# Patient Record
Sex: FEMALE | Race: WHITE | NOT HISPANIC OR LATINO | Employment: OTHER | URBAN - METROPOLITAN AREA
[De-identification: names, ages, dates, MRNs, and addresses within clinical notes are randomized per-mention and may not be internally consistent; named-entity substitution may affect disease eponyms.]

---

## 2021-12-07 ENCOUNTER — LAB REQUISITION (OUTPATIENT)
Dept: LAB | Facility: CLINIC | Age: 86
End: 2021-12-07
Payer: MEDICARE

## 2021-12-07 DIAGNOSIS — I10 ESSENTIAL (PRIMARY) HYPERTENSION: ICD-10-CM

## 2021-12-08 LAB
ANION GAP SERPL CALCULATED.3IONS-SCNC: 14 MMOL/L (ref 5–18)
BUN SERPL-MCNC: 22 MG/DL (ref 8–28)
CALCIUM SERPL-MCNC: 8.8 MG/DL (ref 8.5–10.5)
CHLORIDE BLD-SCNC: 98 MMOL/L (ref 98–107)
CO2 SERPL-SCNC: 22 MMOL/L (ref 22–31)
CREAT SERPL-MCNC: 1.04 MG/DL (ref 0.6–1.1)
GFR SERPL CREATININE-BSD FRML MDRD: 49 ML/MIN/1.73M2
GLUCOSE BLD-MCNC: 88 MG/DL (ref 70–125)
POTASSIUM BLD-SCNC: 4.4 MMOL/L (ref 3.5–5)
SODIUM SERPL-SCNC: 134 MMOL/L (ref 136–145)

## 2021-12-08 PROCEDURE — 80048 BASIC METABOLIC PNL TOTAL CA: CPT | Mod: ORL | Performed by: FAMILY MEDICINE

## 2021-12-08 PROCEDURE — 36415 COLL VENOUS BLD VENIPUNCTURE: CPT | Mod: ORL | Performed by: FAMILY MEDICINE

## 2021-12-15 ENCOUNTER — LAB REQUISITION (OUTPATIENT)
Dept: LAB | Facility: CLINIC | Age: 86
End: 2021-12-15
Payer: MEDICARE

## 2021-12-15 DIAGNOSIS — R60.9 EDEMA, UNSPECIFIED: ICD-10-CM

## 2021-12-15 DIAGNOSIS — D64.9 ANEMIA, UNSPECIFIED: ICD-10-CM

## 2021-12-15 LAB
BASOPHILS # BLD AUTO: 0 10E3/UL (ref 0–0.2)
BASOPHILS NFR BLD AUTO: 0 %
BNP SERPL-MCNC: 461 PG/ML (ref 0–167)
EOSINOPHIL # BLD AUTO: 0.1 10E3/UL (ref 0–0.7)
EOSINOPHIL NFR BLD AUTO: 1 %
ERYTHROCYTE [DISTWIDTH] IN BLOOD BY AUTOMATED COUNT: 13.9 % (ref 10–15)
HCT VFR BLD AUTO: 39.4 % (ref 35–47)
HGB BLD-MCNC: 13.3 G/DL (ref 11.7–15.7)
IMM GRANULOCYTES # BLD: 0 10E3/UL
IMM GRANULOCYTES NFR BLD: 0 %
LYMPHOCYTES # BLD AUTO: 0.4 10E3/UL (ref 0.8–5.3)
LYMPHOCYTES NFR BLD AUTO: 9 %
MCH RBC QN AUTO: 32.9 PG (ref 26.5–33)
MCHC RBC AUTO-ENTMCNC: 33.8 G/DL (ref 31.5–36.5)
MCV RBC AUTO: 98 FL (ref 78–100)
MONOCYTES # BLD AUTO: 0.6 10E3/UL (ref 0–1.3)
MONOCYTES NFR BLD AUTO: 13 %
NEUTROPHILS # BLD AUTO: 3.6 10E3/UL (ref 1.6–8.3)
NEUTROPHILS NFR BLD AUTO: 77 %
NRBC # BLD AUTO: 0 10E3/UL
NRBC BLD AUTO-RTO: 0 /100
PLATELET # BLD AUTO: 134 10E3/UL (ref 150–450)
RBC # BLD AUTO: 4.04 10E6/UL (ref 3.8–5.2)
WBC # BLD AUTO: 4.7 10E3/UL (ref 4–11)

## 2021-12-15 PROCEDURE — 83880 ASSAY OF NATRIURETIC PEPTIDE: CPT | Mod: ORL | Performed by: NURSE PRACTITIONER

## 2021-12-15 PROCEDURE — 85025 COMPLETE CBC W/AUTO DIFF WBC: CPT | Mod: ORL | Performed by: NURSE PRACTITIONER

## 2021-12-15 PROCEDURE — 36415 COLL VENOUS BLD VENIPUNCTURE: CPT | Mod: ORL | Performed by: NURSE PRACTITIONER

## 2021-12-16 ENCOUNTER — LAB REQUISITION (OUTPATIENT)
Dept: LAB | Facility: CLINIC | Age: 86
End: 2021-12-16
Payer: MEDICARE

## 2021-12-16 LAB — EJECTION FRACTION: NORMAL %

## 2021-12-16 PROCEDURE — U0003 INFECTIOUS AGENT DETECTION BY NUCLEIC ACID (DNA OR RNA); SEVERE ACUTE RESPIRATORY SYNDROME CORONAVIRUS 2 (SARS-COV-2) (CORONAVIRUS DISEASE [COVID-19]), AMPLIFIED PROBE TECHNIQUE, MAKING USE OF HIGH THROUGHPUT TECHNOLOGIES AS DESCRIBED BY CMS-2020-01-R: HCPCS | Mod: ORL | Performed by: NURSE PRACTITIONER

## 2021-12-17 LAB — SARS-COV-2 RNA RESP QL NAA+PROBE: NEGATIVE

## 2021-12-23 ENCOUNTER — LAB REQUISITION (OUTPATIENT)
Dept: LAB | Facility: CLINIC | Age: 86
End: 2021-12-23
Payer: MEDICARE

## 2021-12-23 DIAGNOSIS — R94.6 ABNORMAL RESULTS OF THYROID FUNCTION STUDIES: ICD-10-CM

## 2021-12-23 DIAGNOSIS — Z79.899 OTHER LONG TERM (CURRENT) DRUG THERAPY: ICD-10-CM

## 2021-12-23 DIAGNOSIS — Z13.228 ENCOUNTER FOR SCREENING FOR OTHER METABOLIC DISORDERS: ICD-10-CM

## 2021-12-23 PROCEDURE — 84443 ASSAY THYROID STIM HORMONE: CPT | Mod: ORL | Performed by: NURSE PRACTITIONER

## 2021-12-23 PROCEDURE — 36415 COLL VENOUS BLD VENIPUNCTURE: CPT | Mod: ORL | Performed by: NURSE PRACTITIONER

## 2021-12-23 PROCEDURE — 80053 COMPREHEN METABOLIC PANEL: CPT | Mod: ORL | Performed by: NURSE PRACTITIONER

## 2021-12-24 ENCOUNTER — LAB REQUISITION (OUTPATIENT)
Dept: LAB | Facility: CLINIC | Age: 86
End: 2021-12-24
Payer: MEDICARE

## 2021-12-24 DIAGNOSIS — R82.79 OTHER ABNORMAL FINDINGS ON MICROBIOLOGICAL EXAMINATION OF URINE: ICD-10-CM

## 2021-12-24 LAB
ALBUMIN SERPL-MCNC: 3.7 G/DL (ref 3.5–5)
ALBUMIN UR-MCNC: 30 MG/DL
ALP SERPL-CCNC: 46 U/L (ref 45–120)
ALT SERPL W P-5'-P-CCNC: <9 U/L (ref 0–45)
ANION GAP SERPL CALCULATED.3IONS-SCNC: 13 MMOL/L (ref 5–18)
APPEARANCE UR: CLEAR
AST SERPL W P-5'-P-CCNC: 21 U/L (ref 0–40)
BILIRUB SERPL-MCNC: 1.1 MG/DL (ref 0–1)
BILIRUB UR QL STRIP: NEGATIVE
BUN SERPL-MCNC: 22 MG/DL (ref 8–28)
CALCIUM SERPL-MCNC: 9.1 MG/DL (ref 8.5–10.5)
CHLORIDE BLD-SCNC: 94 MMOL/L (ref 98–107)
CO2 SERPL-SCNC: 22 MMOL/L (ref 22–31)
COLOR UR AUTO: YELLOW
CREAT SERPL-MCNC: 1.27 MG/DL (ref 0.6–1.1)
GFR SERPL CREATININE-BSD FRML MDRD: 41 ML/MIN/1.73M2
GLUCOSE BLD-MCNC: 117 MG/DL (ref 70–125)
GLUCOSE UR STRIP-MCNC: NEGATIVE MG/DL
HGB UR QL STRIP: NEGATIVE
KETONES UR STRIP-MCNC: NEGATIVE MG/DL
LEUKOCYTE ESTERASE UR QL STRIP: NEGATIVE
MUCOUS THREADS #/AREA URNS LPF: PRESENT /LPF
NITRATE UR QL: NEGATIVE
PH UR STRIP: 5.5 [PH] (ref 5–7)
POTASSIUM BLD-SCNC: 4 MMOL/L (ref 3.5–5)
PROT SERPL-MCNC: 6.1 G/DL (ref 6–8)
RBC URINE: 0 /HPF
SODIUM SERPL-SCNC: 129 MMOL/L (ref 136–145)
SP GR UR STRIP: 1.02 (ref 1–1.03)
SQUAMOUS EPITHELIAL: 3 /HPF
TRANSITIONAL EPI: <1 /HPF
TSH SERPL DL<=0.005 MIU/L-ACNC: 2.66 UIU/ML (ref 0.3–5)
UROBILINOGEN UR STRIP-MCNC: <2 MG/DL
WBC URINE: 5 /HPF

## 2021-12-24 PROCEDURE — 81001 URINALYSIS AUTO W/SCOPE: CPT | Mod: ORL | Performed by: NURSE PRACTITIONER

## 2022-03-15 ENCOUNTER — LAB REQUISITION (OUTPATIENT)
Dept: LAB | Facility: CLINIC | Age: 87
End: 2022-03-15
Payer: MEDICARE

## 2022-03-15 DIAGNOSIS — I10 ESSENTIAL (PRIMARY) HYPERTENSION: ICD-10-CM

## 2022-03-16 LAB
ANION GAP SERPL CALCULATED.3IONS-SCNC: 11 MMOL/L (ref 5–18)
BUN SERPL-MCNC: 19 MG/DL (ref 8–28)
CALCIUM SERPL-MCNC: 9.1 MG/DL (ref 8.5–10.5)
CHLORIDE BLD-SCNC: 97 MMOL/L (ref 98–107)
CO2 SERPL-SCNC: 29 MMOL/L (ref 22–31)
CREAT SERPL-MCNC: 0.82 MG/DL (ref 0.6–1.1)
GFR SERPL CREATININE-BSD FRML MDRD: 69 ML/MIN/1.73M2
GLUCOSE BLD-MCNC: 97 MG/DL (ref 70–125)
POTASSIUM BLD-SCNC: 3.8 MMOL/L (ref 3.5–5)
SODIUM SERPL-SCNC: 137 MMOL/L (ref 136–145)

## 2022-03-16 PROCEDURE — P9604 ONE-WAY ALLOW PRORATED TRIP: HCPCS | Mod: ORL | Performed by: NURSE PRACTITIONER

## 2022-03-16 PROCEDURE — 80048 BASIC METABOLIC PNL TOTAL CA: CPT | Mod: ORL | Performed by: NURSE PRACTITIONER

## 2022-03-16 PROCEDURE — 36415 COLL VENOUS BLD VENIPUNCTURE: CPT | Mod: ORL | Performed by: NURSE PRACTITIONER

## 2022-06-03 ENCOUNTER — TRANSFERRED RECORDS (OUTPATIENT)
Dept: HEALTH INFORMATION MANAGEMENT | Facility: CLINIC | Age: 87
End: 2022-06-03
Payer: MEDICARE

## 2022-06-07 ENCOUNTER — LAB REQUISITION (OUTPATIENT)
Dept: LAB | Facility: CLINIC | Age: 87
End: 2022-06-07
Payer: MEDICARE

## 2022-06-07 DIAGNOSIS — N39.0 URINARY TRACT INFECTION, SITE NOT SPECIFIED: ICD-10-CM

## 2022-06-07 LAB
ALBUMIN UR-MCNC: NEGATIVE MG/DL
APPEARANCE UR: CLEAR
BILIRUB UR QL STRIP: NEGATIVE
COLOR UR AUTO: COLORLESS
GLUCOSE UR STRIP-MCNC: NEGATIVE MG/DL
HGB UR QL STRIP: ABNORMAL
HYALINE CASTS: 3 /LPF
KETONES UR STRIP-MCNC: NEGATIVE MG/DL
LEUKOCYTE ESTERASE UR QL STRIP: NEGATIVE
NITRATE UR QL: NEGATIVE
PH UR STRIP: 6.5 [PH] (ref 5–7)
RBC URINE: 1 /HPF
SP GR UR STRIP: 1.01 (ref 1–1.03)
SQUAMOUS EPITHELIAL: 1 /HPF
TRANSITIONAL EPI: <1 /HPF
UROBILINOGEN UR STRIP-MCNC: <2 MG/DL
WBC URINE: <1 /HPF

## 2022-06-07 PROCEDURE — 81001 URINALYSIS AUTO W/SCOPE: CPT | Mod: ORL | Performed by: NURSE PRACTITIONER

## 2022-06-10 ENCOUNTER — TRANSCRIBE ORDERS (OUTPATIENT)
Dept: OTHER | Age: 87
End: 2022-06-10
Payer: MEDICARE

## 2022-06-10 DIAGNOSIS — H34.8110 CENTRAL RETINAL VEIN OCCLUSION WITH MACULAR EDEMA OF RIGHT EYE (H): ICD-10-CM

## 2022-06-10 DIAGNOSIS — H25.813 COMBINED FORMS OF AGE-RELATED CATARACT, BILATERAL: Primary | ICD-10-CM

## 2022-06-13 ENCOUNTER — TELEPHONE (OUTPATIENT)
Dept: OPHTHALMOLOGY | Facility: CLINIC | Age: 87
End: 2022-06-13
Payer: MEDICARE

## 2022-06-13 NOTE — TELEPHONE ENCOUNTER
Sonja Price [phone]      Spoke to daughter.    H/o central retina occlusion around 2013    Pt seen by ophthalmologist for cataract and having difficulty with     Recommendation for evaluation with [facility] to help accommodate with physical disabilities and with retina provider for h/o retina vein occlusion    Scheduled first available with Dr. Doty/Dr. Wong with Dr. Doty June 29th    Pt/daughter aware of date/time/location/duration at [facility]    Would appreciate new patient packet    Note to patient communicator to assist in mailing out    Reid Kahn RN 4:26 PM 06/14/22                [facility] Call Center    Phone Message    May a detailed message be left on voicemail: yes     Reason for Call: Appointment Intake    Referring Provider Name: Dr Hesham Lauren   Diagnosis and/or Symptoms: Combined forms of age-related cataract, bilateral   Central retinal vein occlusion with macular edema of right eye    - Please advise on how to get pt scheduled and please reach out to pts daughter Sonja to get appt set . Thank you     Action Taken: Other: EYE     Travel Screening: Not Applicable

## 2022-06-14 ENCOUNTER — LAB REQUISITION (OUTPATIENT)
Dept: LAB | Facility: CLINIC | Age: 87
End: 2022-06-14
Payer: MEDICARE

## 2022-06-14 DIAGNOSIS — D64.9 ANEMIA, UNSPECIFIED: ICD-10-CM

## 2022-06-14 DIAGNOSIS — I10 ESSENTIAL (PRIMARY) HYPERTENSION: ICD-10-CM

## 2022-06-15 ENCOUNTER — TELEPHONE (OUTPATIENT)
Dept: OPHTHALMOLOGY | Facility: CLINIC | Age: 87
End: 2022-06-15
Payer: MEDICARE

## 2022-06-15 DIAGNOSIS — H34.8332: Primary | ICD-10-CM

## 2022-06-15 LAB
ANION GAP SERPL CALCULATED.3IONS-SCNC: 12 MMOL/L (ref 5–18)
BUN SERPL-MCNC: 27 MG/DL (ref 8–28)
CALCIUM SERPL-MCNC: 9.1 MG/DL (ref 8.5–10.5)
CHLORIDE BLD-SCNC: 99 MMOL/L (ref 98–107)
CO2 SERPL-SCNC: 27 MMOL/L (ref 22–31)
CREAT SERPL-MCNC: 0.99 MG/DL (ref 0.6–1.1)
ERYTHROCYTE [DISTWIDTH] IN BLOOD BY AUTOMATED COUNT: 12.7 % (ref 10–15)
GFR SERPL CREATININE-BSD FRML MDRD: 55 ML/MIN/1.73M2
GLUCOSE BLD-MCNC: 103 MG/DL (ref 70–125)
HCT VFR BLD AUTO: 45.7 % (ref 35–47)
HGB BLD-MCNC: 14.9 G/DL (ref 11.7–15.7)
MCH RBC QN AUTO: 33 PG (ref 26.5–33)
MCHC RBC AUTO-ENTMCNC: 32.6 G/DL (ref 31.5–36.5)
MCV RBC AUTO: 101 FL (ref 78–100)
PLATELET # BLD AUTO: 144 10E3/UL (ref 150–450)
POTASSIUM BLD-SCNC: 3.8 MMOL/L (ref 3.5–5)
RBC # BLD AUTO: 4.51 10E6/UL (ref 3.8–5.2)
SODIUM SERPL-SCNC: 138 MMOL/L (ref 136–145)
WBC # BLD AUTO: 5.8 10E3/UL (ref 4–11)

## 2022-06-15 PROCEDURE — 80048 BASIC METABOLIC PNL TOTAL CA: CPT | Mod: ORL | Performed by: NURSE PRACTITIONER

## 2022-06-15 PROCEDURE — 36415 COLL VENOUS BLD VENIPUNCTURE: CPT | Mod: ORL | Performed by: NURSE PRACTITIONER

## 2022-06-15 PROCEDURE — P9604 ONE-WAY ALLOW PRORATED TRIP: HCPCS | Mod: ORL | Performed by: NURSE PRACTITIONER

## 2022-06-15 PROCEDURE — 85027 COMPLETE CBC AUTOMATED: CPT | Mod: ORL | Performed by: NURSE PRACTITIONER

## 2022-06-15 NOTE — TELEPHONE ENCOUNTER
Mailed out a new pt packet with time, date and a map     Rosa Guadarrama Communication Facilitator on 6/15/2022 at 8:41 AM

## 2022-06-19 ENCOUNTER — LAB REQUISITION (OUTPATIENT)
Dept: LAB | Facility: CLINIC | Age: 87
End: 2022-06-19
Payer: MEDICARE

## 2022-06-19 DIAGNOSIS — R79.89 OTHER SPECIFIED ABNORMAL FINDINGS OF BLOOD CHEMISTRY: ICD-10-CM

## 2022-06-29 ENCOUNTER — OFFICE VISIT (OUTPATIENT)
Dept: OPHTHALMOLOGY | Facility: CLINIC | Age: 87
End: 2022-06-29
Attending: OPHTHALMOLOGY
Payer: MEDICARE

## 2022-06-29 DIAGNOSIS — H04.123 DRY EYE SYNDROME OF BOTH EYES: ICD-10-CM

## 2022-06-29 DIAGNOSIS — H25.813 COMBINED FORMS OF AGE-RELATED CATARACT, BILATERAL: ICD-10-CM

## 2022-06-29 DIAGNOSIS — H34.8110 CENTRAL RETINAL VEIN OCCLUSION WITH MACULAR EDEMA OF RIGHT EYE (H): Primary | ICD-10-CM

## 2022-06-29 DIAGNOSIS — H35.371 EPIRETINAL MEMBRANE (ERM) OF RIGHT EYE: ICD-10-CM

## 2022-06-29 PROCEDURE — 92004 COMPRE OPH EXAM NEW PT 1/>: CPT | Performed by: OPHTHALMOLOGY

## 2022-06-29 PROCEDURE — G0463 HOSPITAL OUTPT CLINIC VISIT: HCPCS | Mod: 25

## 2022-06-29 PROCEDURE — 92134 CPTRZ OPH DX IMG PST SGM RTA: CPT | Performed by: OPHTHALMOLOGY

## 2022-06-29 PROCEDURE — 76516 ECHO EXAM OF EYE: CPT | Performed by: OPHTHALMOLOGY

## 2022-06-29 RX ORDER — ACETAMINOPHEN 325 MG/1
650 TABLET ORAL
COMMUNITY
Start: 2022-03-02 | End: 2024-01-01

## 2022-06-29 RX ORDER — METOPROLOL SUCCINATE 50 MG/1
TABLET, EXTENDED RELEASE ORAL
COMMUNITY
Start: 2022-06-14 | End: 2024-01-01

## 2022-06-29 RX ORDER — ALLOPURINOL 100 MG/1
TABLET ORAL
COMMUNITY
Start: 2022-06-14 | End: 2022-09-16

## 2022-06-29 RX ORDER — FUROSEMIDE 40 MG
TABLET ORAL
COMMUNITY
Start: 2022-06-14 | End: 2024-01-01

## 2022-06-29 RX ORDER — CHOLECALCIFEROL (VITAMIN D3) 125 MCG
1 CAPSULE ORAL
COMMUNITY
End: 2024-01-01

## 2022-06-29 RX ORDER — CARBIDOPA AND LEVODOPA 50; 200 MG/1; MG/1
1 TABLET, EXTENDED RELEASE ORAL AT BEDTIME
COMMUNITY
Start: 2021-09-13 | End: 2024-01-01

## 2022-06-29 RX ORDER — PRAMIPEXOLE DIHYDROCHLORIDE 0.12 MG/1
TABLET ORAL
COMMUNITY
Start: 2022-02-07 | End: 2024-01-01

## 2022-06-29 RX ORDER — SALIVA STIMULANT COMB. NO.3
SPRAY, NON-AEROSOL (ML) MUCOUS MEMBRANE
COMMUNITY
Start: 2022-02-16 | End: 2024-01-01

## 2022-06-29 RX ORDER — SERTRALINE HYDROCHLORIDE 25 MG/1
TABLET, FILM COATED ORAL
COMMUNITY
Start: 2022-06-23 | End: 2024-01-01

## 2022-06-29 RX ORDER — NYSTATIN 100000 [USP'U]/G
POWDER TOPICAL
COMMUNITY
Start: 2022-06-21 | End: 2024-01-01

## 2022-06-29 RX ORDER — POTASSIUM CHLORIDE 750 MG/1
10 TABLET, EXTENDED RELEASE ORAL
COMMUNITY
Start: 2022-02-01 | End: 2024-01-01

## 2022-06-29 RX ORDER — LOSARTAN POTASSIUM 50 MG/1
TABLET ORAL
COMMUNITY
Start: 2022-06-14 | End: 2022-09-16

## 2022-06-29 RX ORDER — SIMETHICONE 80 MG
80 TABLET,CHEWABLE ORAL
COMMUNITY
Start: 2022-01-13 | End: 2024-01-01

## 2022-06-29 RX ORDER — APIXABAN 5 MG/1
TABLET, FILM COATED ORAL
COMMUNITY
Start: 2022-06-14 | End: 2024-01-01

## 2022-06-29 ASSESSMENT — SLIT LAMP EXAM - LIDS
COMMENTS: 1+ DERMATOCHALASIS, 1+ PTOSIS
COMMENTS: 1+ DERMATOCHALASIS, 1+ PTOSIS

## 2022-06-29 ASSESSMENT — CONF VISUAL FIELD
OS_INFERIOR_NASAL_RESTRICTION: 1
OS_SUPERIOR_NASAL_RESTRICTION: 1
OD_SUPERIOR_NASAL_RESTRICTION: 3
OD_INFERIOR_TEMPORAL_RESTRICTION: 1
OS_INFERIOR_TEMPORAL_RESTRICTION: 1
OS_SUPERIOR_TEMPORAL_RESTRICTION: 1
OD_INFERIOR_NASAL_RESTRICTION: 1
OD_SUPERIOR_TEMPORAL_RESTRICTION: 1

## 2022-06-29 ASSESSMENT — REFRACTION_WEARINGRX
OS_AXIS: 172
OS_CYLINDER: +1.50
OD_AXIS: 005
OD_CYLINDER: +1.25
OS_ADD: +2.75
OS_SPHERE: -5.25
OD_SPHERE: -5.50
SPECS_TYPE: TRIFOCAL
OD_ADD: +2.75

## 2022-06-29 ASSESSMENT — TONOMETRY
OD_IOP_MMHG: 14
OS_IOP_MMHG: 16
IOP_METHOD: TONOPEN

## 2022-06-29 ASSESSMENT — CUP TO DISC RATIO
OD_RATIO: 0.1
OS_RATIO: 0.2

## 2022-06-29 ASSESSMENT — VISUAL ACUITY
OD_CC+: -1
OS_CC+: -2
OD_CC: 20/125
METHOD: SNELLEN - LINEAR
OS_CC: 20/70
CORRECTION_TYPE: GLASSES

## 2022-06-29 ASSESSMENT — EXTERNAL EXAM - LEFT EYE: OS_EXAM: NORMAL

## 2022-06-29 ASSESSMENT — EXTERNAL EXAM - RIGHT EYE: OD_EXAM: NORMAL

## 2022-06-29 NOTE — NURSING NOTE
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  Negative for glare, haloes and eye pain    Treatments tried: artificial tears    Pain scale: 0/10    Comments: Hemispheric branch retinal vein occlusion (BRVO) of both eyes and cataracts              Comments     She has not had an eye exam for the past few years.  She was getting injections with Dr Siegel in [place] and with Dr Crews in [place] but chose to stop them after a period of time, when her vision seemed uneffected by the treatment.  She tells me that her vision seems stable but poor.  She struggles to read small print.    Both eyes seem intermittently dry.  Using artificial tears does help the discomfort.    THERESE Rene 12:49 PM  June 29, 2022

## 2022-06-29 NOTE — PROGRESS NOTES
Goes by Moriah    CC -  Decreased vision right eye     INTERVAL HISTORY - Initial visit with me    HPI -   Leola Bui is a  86 year old year-old patient presenting for loss of vision right eye for years. She was told she has vein occlusion right eye and received multiple injection in [place] (by Dr. Siegel) and in [place] (Dr. Diaz). Last injection ~3 yrs ago  Vision is stable bello the last few years. Reading is difficult     RETINAL IMAGING:  OCT 6/29/2022  OD - CME, ERM  OS - normal foveal contour; drusen at fovea      ASSESSMENT & PLAN    1. Central retinal vein occlusion with macular edema of right eye    2. Combined forms of age-related cataract, bilateral    3. Epiretinal membrane (ERM) of right eye      History of CRVO right eye (records requested for review); S/P avastin injection (last 3 yrs ago)  Vision significantly affected by cataract ou  Will plan for CE IOL left eye first and then right eye; will re evaluate for CME and ERM after cataract surgery    Risks of surgery including but not limited to infection (rare but a devastating complication that will need intraocular antibiotics injection and possibly more surgeries), risk of retinal detachment, dropped nuclear pieces or dropped intraocular lens, glaucoma may need further medications, corneal edema that may need a long course of medication or even surgery were discussed  In length with the patient. We discussed about the benefits of CE IOL surgery and its alternatives. All the questions answered. Moriah and her daughters agreed and wanted to proceed.     Target refraction -0.25D  45 minute surgery  Resident/fellow participation    Will do left eye first; plan for phone appt for POD1; in one week, will do right eye CE IOL and examining left eye     She will see her PCP for pre op exam in 2 days    Call Daysi (daughter) for scheduling at       Complete documentation of historical and exam elements from today's encounter can be found in the full  encounter summary report (not reduplicated in this progress note). I personally obtained the chief complaint(s) and history of present illness.  I confirmed and edited as necessary the review of systems, past medical/surgical history, family history, social history, and examination findings as documented by others; and I examined the patient myself. I personally reviewed the relevant tests, images, and reports as documented above. I formulated and edited as necessary the assessment and plan and discussed the findings and management plan with the patient and family.     Contreras Doty MD, PhD

## 2022-07-01 DIAGNOSIS — H25.13 AGE-RELATED NUCLEAR CATARACT OF BOTH EYES: Primary | ICD-10-CM

## 2022-07-12 ENCOUNTER — TELEPHONE (OUTPATIENT)
Dept: OPHTHALMOLOGY | Facility: CLINIC | Age: 87
End: 2022-07-12

## 2022-07-12 NOTE — TELEPHONE ENCOUNTER
Called patient to schedule surgery with Dr. caldwell     Date of Surgery: 9/12 9/19    Location of surgery: [facility]    Pre-Op H&P: pcp    Imaging Scheduled:  No    Discussed COVID-19 Testing: Yes    Post-Op Appt Date: na    Surgery Packet Mailed: yes      Additional comments:   Spoke with Sonja(daughter) gave all the info regarding surgery also told her that a surgery packet would be mailed. She will call with any questions.     She did request a call from the nurse     Renetta Bell on 7/12/2022 at 3:08 PM

## 2022-08-23 ENCOUNTER — TELEPHONE (OUTPATIENT)
Dept: OPHTHALMOLOGY | Facility: CLINIC | Age: 87
End: 2022-08-23

## 2022-08-23 NOTE — TELEPHONE ENCOUNTER
Called and spoke to patient's daughter Sonja regarding questions. Mentioned that we would recommend showering prior to the surgery as is possible. She expressed that Ms. Bui was worried about her arm moving and disrupting the procedure. Discussed anesthesia for cataract surgery and reassured her that we would be able to carry out the surgery in a safe manner and would help the patient keep her arms at her sides during the procedure.

## 2022-08-23 NOTE — TELEPHONE ENCOUNTER
Sonja and Leola called with concerns for Leola's upcoming cataract procedures 09/12 and 09/19 with Dr. Contreras Fuentes. Leola has Parkinson disease and is worried about movement during surgery she wanted to know that this addressed/accounted for in preparing for surgery.     Sonja was also concerned about the 2 showers required prior to surgery as showering is difficult for Leola. I am sending these concerns to the provider and team to reach out with specific information based on Leola's case.

## 2022-09-07 NOTE — OR NURSING
Received phone call from patient and patients Daughter, Sonja Price, about pre op questions for 9/12 surgery. Patient wanted to make sure daughter was called for pre op call when it occurs and had questions about covid testing. Patient was also given fax number for her H&P.

## 2022-09-13 ENCOUNTER — TELEPHONE (OUTPATIENT)
Dept: OPHTHALMOLOGY | Facility: CLINIC | Age: 87
End: 2022-09-13

## 2022-09-15 ENCOUNTER — TELEPHONE (OUTPATIENT)
Dept: OPHTHALMOLOGY | Facility: CLINIC | Age: 87
End: 2022-09-15

## 2022-09-15 NOTE — TELEPHONE ENCOUNTER
Spoke with Sonja to confirm surgery dates for Leola, 09/19 and 10/03. I explained no drops are needed prior to surgery but more than likely post-op. Sonja says the Assisted Living facility needs provider orders in order to administer the drops to Leola following surgery.     I am passing this note along to the team so they are aware and can include orders for the assisted living following surgery.

## 2022-09-15 NOTE — TELEPHONE ENCOUNTER
Confirmed new surgery schedule    Patient is scheduled for surgery with Dr. Contreras Fuentes     Spoke with: Sonja Price     Date of Surgery: 09/19, and 10/03     Location: [facility]:  [address] [place] [id number]     H&P will be completed at: Was completed beginning of September, [facility]     COVID testing: Assisted Living    Post Op scheduled on 09/28 and 10/12     Surgery packet was sent already for previous dates, sending new Post op appointment listing     Additional comments: Advised RN will call 1 - 3 business days prior with arrival time and instructions. Informed patient they will need an adult  and responsible adult to stay with for 24 hours following surgery

## 2022-09-16 RX ORDER — LOSARTAN POTASSIUM 50 MG/1
50 TABLET ORAL
COMMUNITY
Start: 2021-06-28 | End: 2024-01-01

## 2022-09-16 RX ORDER — MIRABEGRON 25 MG/1
TABLET, FILM COATED, EXTENDED RELEASE ORAL
COMMUNITY
Start: 2022-09-09 | End: 2024-01-01

## 2022-09-16 RX ORDER — FLUTICASONE PROPIONATE 50 MCG
SPRAY, SUSPENSION (ML) NASAL
COMMUNITY
Start: 2022-04-27 | End: 2024-01-01

## 2022-09-16 RX ORDER — ALLOPURINOL 100 MG/1
100 TABLET ORAL
COMMUNITY
Start: 2021-06-28 | End: 2024-01-01

## 2022-09-16 RX ORDER — LIDOCAINE 4 G/G
PATCH TOPICAL
COMMUNITY
End: 2024-01-01

## 2022-09-19 ENCOUNTER — ANESTHESIA EVENT (OUTPATIENT)
Dept: SURGERY | Facility: CLINIC | Age: 87
End: 2022-09-19
Payer: MEDICARE

## 2022-09-19 ENCOUNTER — ANESTHESIA (OUTPATIENT)
Dept: SURGERY | Facility: CLINIC | Age: 87
End: 2022-09-19
Payer: MEDICARE

## 2022-09-19 ENCOUNTER — HOSPITAL ENCOUNTER (OUTPATIENT)
Facility: CLINIC | Age: 87
Discharge: HOME | End: 2022-09-19
Attending: OPHTHALMOLOGY | Admitting: OPHTHALMOLOGY
Payer: MEDICARE

## 2022-09-19 ENCOUNTER — TELEPHONE (OUTPATIENT)
Dept: OPHTHALMOLOGY | Facility: CLINIC | Age: 87
End: 2022-09-19

## 2022-09-19 VITALS
DIASTOLIC BLOOD PRESSURE: 133 MMHG | HEIGHT: 66 IN | SYSTOLIC BLOOD PRESSURE: 187 MMHG | HEART RATE: 73 BPM | WEIGHT: 155.6 LBS | TEMPERATURE: 97 F | OXYGEN SATURATION: 96 % | RESPIRATION RATE: 16 BRPM | BODY MASS INDEX: 25.01 KG/M2

## 2022-09-19 DIAGNOSIS — H25.12 NUCLEAR SCLEROTIC CATARACT OF LEFT EYE: Primary | ICD-10-CM

## 2022-09-19 LAB — GLUCOSE BLDC GLUCOMTR-MCNC: 113 MG/DL (ref 70–99)

## 2022-09-19 PROCEDURE — 710N000012 HC RECOVERY PHASE 2, PER MINUTE: Performed by: OPHTHALMOLOGY

## 2022-09-19 PROCEDURE — V2632 POST CHMBR INTRAOCULAR LENS: HCPCS | Performed by: OPHTHALMOLOGY

## 2022-09-19 PROCEDURE — 370N000017 HC ANESTHESIA TECHNICAL FEE, PER MIN: Performed by: OPHTHALMOLOGY

## 2022-09-19 PROCEDURE — 250N000011 HC RX IP 250 OP 636: Performed by: NURSE ANESTHETIST, CERTIFIED REGISTERED

## 2022-09-19 PROCEDURE — 999N000141 HC STATISTIC PRE-PROCEDURE NURSING ASSESSMENT: Performed by: OPHTHALMOLOGY

## 2022-09-19 PROCEDURE — 66984 XCAPSL CTRC RMVL W/O ECP: CPT | Mod: LT | Performed by: OPHTHALMOLOGY

## 2022-09-19 PROCEDURE — 250N000009 HC RX 250: Performed by: OPHTHALMOLOGY

## 2022-09-19 PROCEDURE — 360N000076 HC SURGERY LEVEL 3, PER MIN: Performed by: OPHTHALMOLOGY

## 2022-09-19 PROCEDURE — 250N000011 HC RX IP 250 OP 636: Performed by: OPHTHALMOLOGY

## 2022-09-19 PROCEDURE — 258N000003 HC RX IP 258 OP 636: Performed by: NURSE ANESTHETIST, CERTIFIED REGISTERED

## 2022-09-19 PROCEDURE — 272N000001 HC OR GENERAL SUPPLY STERILE: Performed by: OPHTHALMOLOGY

## 2022-09-19 PROCEDURE — 250N000009 HC RX 250: Performed by: STUDENT IN AN ORGANIZED HEALTH CARE EDUCATION/TRAINING PROGRAM

## 2022-09-19 PROCEDURE — 82962 GLUCOSE BLOOD TEST: CPT

## 2022-09-19 DEVICE — IMPLANTABLE DEVICE: Type: IMPLANTABLE DEVICE | Site: EYE | Status: FUNCTIONAL

## 2022-09-19 RX ORDER — PROPARACAINE HYDROCHLORIDE 5 MG/ML
1 SOLUTION/ DROPS OPHTHALMIC ONCE
Status: DISCONTINUED | OUTPATIENT
Start: 2022-09-19 | End: 2022-09-19 | Stop reason: HOSPADM

## 2022-09-19 RX ORDER — DICLOFENAC SODIUM 1 MG/ML
1 SOLUTION/ DROPS OPHTHALMIC
Status: DISCONTINUED | OUTPATIENT
Start: 2022-09-19 | End: 2022-09-19 | Stop reason: HOSPADM

## 2022-09-19 RX ORDER — PROPARACAINE HYDROCHLORIDE 5 MG/ML
1 SOLUTION/ DROPS OPHTHALMIC ONCE
Status: COMPLETED | OUTPATIENT
Start: 2022-09-19 | End: 2022-09-19

## 2022-09-19 RX ORDER — TRIAMCINOLONE ACETONIDE 40 MG/ML
INJECTION, SUSPENSION INTRA-ARTICULAR; INTRAMUSCULAR PRN
Status: SHIPPED | OUTPATIENT
Start: 2022-09-19

## 2022-09-19 RX ORDER — BALANCED SALT SOLUTION 6.4; .75; .48; .3; 3.9; 1.7 MG/ML; MG/ML; MG/ML; MG/ML; MG/ML; MG/ML
SOLUTION OPHTHALMIC PRN
Status: DISCONTINUED | OUTPATIENT
Start: 2022-09-19 | End: 2022-09-19 | Stop reason: HOSPADM

## 2022-09-19 RX ORDER — MOXIFLOXACIN IN NACL,ISO-OS/PF 1.6 MG/ML
SYRINGE (ML) INTRAOCULAR PRN
Status: DISCONTINUED | OUTPATIENT
Start: 2022-09-19 | End: 2022-09-19 | Stop reason: HOSPADM

## 2022-09-19 RX ORDER — ACETAMINOPHEN 325 MG/1
975 TABLET ORAL ONCE
Status: DISCONTINUED | OUTPATIENT
Start: 2022-09-19 | End: 2022-09-19 | Stop reason: HOSPADM

## 2022-09-19 RX ORDER — SODIUM CHLORIDE, SODIUM LACTATE, POTASSIUM CHLORIDE, CALCIUM CHLORIDE 600; 310; 30; 20 MG/100ML; MG/100ML; MG/100ML; MG/100ML
INJECTION, SOLUTION INTRAVENOUS CONTINUOUS
Status: DISCONTINUED | OUTPATIENT
Start: 2022-09-19 | End: 2022-09-19 | Stop reason: HOSPADM

## 2022-09-19 RX ORDER — CYCLOPENTOLAT/TROPIC/PHENYLEPH 1%-1%-2.5%
1 DROPS (EA) OPHTHALMIC (EYE)
Status: DISCONTINUED | OUTPATIENT
Start: 2022-09-19 | End: 2022-09-19 | Stop reason: HOSPADM

## 2022-09-19 RX ORDER — LIDOCAINE HYDROCHLORIDE 10 MG/ML
INJECTION, SOLUTION EPIDURAL; INFILTRATION; INTRACAUDAL; PERINEURAL PRN
Status: DISCONTINUED | OUTPATIENT
Start: 2022-09-19 | End: 2022-09-19 | Stop reason: HOSPADM

## 2022-09-19 RX ORDER — KETOROLAC TROMETHAMINE 5 MG/ML
1 SOLUTION OPHTHALMIC 4 TIMES DAILY
Qty: 5 ML | Refills: 0 | Status: SHIPPED | OUTPATIENT
Start: 2022-09-19 | End: 2024-01-01

## 2022-09-19 RX ORDER — FENTANYL CITRATE 50 UG/ML
INJECTION, SOLUTION INTRAMUSCULAR; INTRAVENOUS PRN
Status: DISCONTINUED | OUTPATIENT
Start: 2022-09-19 | End: 2022-09-19

## 2022-09-19 RX ORDER — METOPROLOL TARTRATE 50 MG
50 TABLET ORAL ONCE
Status: DISCONTINUED | OUTPATIENT
Start: 2022-09-19 | End: 2022-09-19

## 2022-09-19 RX ORDER — PREDNISOLONE ACETATE 10 MG/ML
1 SUSPENSION/ DROPS OPHTHALMIC 4 TIMES DAILY
Qty: 5 ML | Refills: 0 | Status: SHIPPED | OUTPATIENT
Start: 2022-09-19 | End: 2024-01-01

## 2022-09-19 RX ORDER — MOXIFLOXACIN 5 MG/ML
1 SOLUTION/ DROPS OPHTHALMIC
Status: DISCONTINUED | OUTPATIENT
Start: 2022-09-19 | End: 2022-09-19 | Stop reason: HOSPADM

## 2022-09-19 RX ORDER — SODIUM CHLORIDE, SODIUM LACTATE, POTASSIUM CHLORIDE, CALCIUM CHLORIDE 600; 310; 30; 20 MG/100ML; MG/100ML; MG/100ML; MG/100ML
INJECTION, SOLUTION INTRAVENOUS CONTINUOUS PRN
Status: DISCONTINUED | OUTPATIENT
Start: 2022-09-19 | End: 2022-09-19

## 2022-09-19 RX ORDER — MOXIFLOXACIN 5 MG/ML
1 SOLUTION/ DROPS OPHTHALMIC 3 TIMES DAILY
Qty: 3 ML | Refills: 0 | Status: SHIPPED | OUTPATIENT
Start: 2022-09-19 | End: 2024-01-01

## 2022-09-19 RX ADMIN — MOXIFLOXACIN OPHTHALMIC SOLUTION 1 DROP: 5 SOLUTION/ DROPS OPHTHALMIC at 07:20

## 2022-09-19 RX ADMIN — FENTANYL CITRATE 25 MCG: 50 INJECTION, SOLUTION INTRAMUSCULAR; INTRAVENOUS at 07:41

## 2022-09-19 RX ADMIN — Medication 1 DROP: at 07:29

## 2022-09-19 RX ADMIN — FENTANYL CITRATE 25 MCG: 50 INJECTION, SOLUTION INTRAMUSCULAR; INTRAVENOUS at 08:09

## 2022-09-19 RX ADMIN — DICLOFENAC SODIUM 1 DROP: 1 SOLUTION OPHTHALMIC at 07:27

## 2022-09-19 RX ADMIN — DICLOFENAC SODIUM 1 DROP: 1 SOLUTION OPHTHALMIC at 07:22

## 2022-09-19 RX ADMIN — MIDAZOLAM 1 MG: 1 INJECTION INTRAMUSCULAR; INTRAVENOUS at 07:37

## 2022-09-19 RX ADMIN — MOXIFLOXACIN OPHTHALMIC SOLUTION 1 DROP: 5 SOLUTION/ DROPS OPHTHALMIC at 07:26

## 2022-09-19 RX ADMIN — PROPARACAINE HYDROCHLORIDE 1 DROP: 5 SOLUTION/ DROPS OPHTHALMIC at 07:18

## 2022-09-19 RX ADMIN — Medication 1 DROP: at 07:24

## 2022-09-19 RX ADMIN — SODIUM CHLORIDE, POTASSIUM CHLORIDE, SODIUM LACTATE AND CALCIUM CHLORIDE: 600; 310; 30; 20 INJECTION, SOLUTION INTRAVENOUS at 07:35

## 2022-09-19 ASSESSMENT — ACTIVITIES OF DAILY LIVING (ADL)
ADLS_ACUITY_SCORE: 45
ADLS_ACUITY_SCORE: 35

## 2022-09-19 NOTE — ANESTHESIA CARE TRANSFER NOTE
Patient: Leola Bui    Procedure: Procedure(s):  PHACOEMULSIFICATION, CATARACT, WITH INTRAOCULAR LENS IMPLANT LEFT       Diagnosis: Combined forms of age-related cataract, bilateral [H25.813]  Diagnosis Additional Information: No value filed.    Anesthesia Type:   No value filed.     Note:    Oropharynx: oropharynx clear of all foreign objects  Level of Consciousness: awake  Oxygen Supplementation: room air    Independent Airway: airway patency satisfactory and stable  Dentition: dentition unchanged  Vital Signs Stable: post-procedure vital signs reviewed and stable  Report to RN Given: handoff report given  Patient transferred to: PACU    Handoff Report: Identifed the Patient, Identified the Reponsible Provider, Reviewed the pertinent medical history, Discussed the surgical course, Reviewed Intra-OP anesthesia mangement and issues during anesthesia, Set expectations for post-procedure period and Allowed opportunity for questions and acknowledgement of understanding      Vitals:  Vitals Value Taken Time   BP     Temp     Pulse     Resp     SpO2         Electronically Signed By: DALE Escobar CRNA  September 19, 2022  8:35 AM

## 2022-09-19 NOTE — TELEPHONE ENCOUNTER
[facility] Call Center    Phone Message    May a detailed message be left on voicemail: yes     Reason for Call: Form or Letter   Type or form/letter needing completion: Signed Orders (With Dr signature)  for Ketorolac drops, Pred Forte and Moxifloxacin to be administered at her assisted living facility.   Provider: Dr. Doty  Date form needed: ASAP  Once completed: Fax form to: [phone]      Action Taken: Message routed to:  [facility] EYE    Travel Screening: Not Applicable

## 2022-09-19 NOTE — ANESTHESIA POSTPROCEDURE EVALUATION
"Patient: Leola Bui    Procedure: Procedure(s):  PHACOEMULSIFICATION, CATARACT, WITH INTRAOCULAR LENS IMPLANT LEFT       Anesthesia Type:  MAC    Note:  Disposition: Outpatient   Postop Pain Control: Uneventful            Sign Out: Well controlled pain   PONV: No   Neuro/Psych: Uneventful            Sign Out: Acceptable/Baseline neuro status   Airway/Respiratory: Uneventful            Sign Out: Acceptable/Baseline resp. status   CV/Hemodynamics: Uneventful            Sign Out: Acceptable CV status; No obvious hypovolemia; No obvious fluid overload   Other NRE: NONE   DID A NON-ROUTINE EVENT OCCUR? No           Last vitals:  Vitals Value Taken Time   /133 09/19/22 0937   Temp 36.1  C (97  F) 09/19/22 0937   Pulse 73 09/19/22 0937   Resp 16 09/19/22 0937   SpO2 96 % 09/19/22 0938   Vitals shown include unvalidated device data.    Patient Vitals for the past 24 hrs:   BP Temp Temp src Pulse Resp SpO2 Height Weight   09/19/22 0937 (!) 187/133 36.1  C (97  F) Oral 73 16 96 % -- --   09/19/22 0900 (!) 162/86 -- -- -- 16 97 % -- --   09/19/22 0845 (!) 169/112 -- -- 75 18 98 % -- --   09/19/22 0833 (!) 173/102 36.1  C (97  F) Axillary 73 18 98 % -- --   09/19/22 0609 (!) 172/110 36.9  C (98.4  F) Oral 74 18 96 % 1.676 m (5' 6\") 70.6 kg (155 lb 9.6 oz)         Electronically Signed By: Shaunna Berrios MD  September 19, 2022  10:08 AM  "

## 2022-09-19 NOTE — ANESTHESIA PREPROCEDURE EVALUATION
Anesthesia Pre-Procedure Evaluation    Patient: Leola Bui   MRN: [id number] : 1935        Procedure : Procedure(s):  PHACOEMULSIFICATION, CATARACT, WITH INTRAOCULAR LENS IMPLANT LEFT          Past Medical History:   Diagnosis Date     Acute diastolic congestive heart failure (H)      Anxiety      Breast cancer (H)      CKD (chronic kidney disease) stage 3, GFR 30-59 ml/min (H)      Essential hypertension      Gout      Osteoporosis      Parkinson disease (H)       Past Surgical History:   Procedure Laterality Date     MASTECTOMY        Allergies   Allergen Reactions     Erythromycin Hives and Rash     Penicillins Hives, Itching and Rash     Sulfa Drugs Hives and Rash     Amlodipine      Other reaction(s): *Unknown     Carvedilol      Other reaction(s): Syncope     Clonidine      Other reaction(s): *Unknown     Diltiazem      Other reaction(s): *Unknown     Flagyl [Metronidazole]      Hydrochlorothiazide      Other reaction(s): *Unknown     Lisinopril       Social History     Tobacco Use     Smoking status: Never Smoker     Smokeless tobacco: Never Used   Substance Use Topics     Alcohol use: Not on file      Wt Readings from Last 1 Encounters:   22 70.6 kg (155 lb 9.6 oz)        Anesthesia Evaluation            ROS/MED HX  ENT/Pulmonary:  - neg pulmonary ROS     Neurologic:     (+) Parkinson's disease,     Cardiovascular:     (+) hypertension-----CHF     METS/Exercise Tolerance:     Hematologic:       Musculoskeletal:       GI/Hepatic:       Renal/Genitourinary:     (+) renal disease, type: CRI,     Endo:       Psychiatric/Substance Use:     (+) psychiatric history anxiety     Infectious Disease:       Malignancy:       Other:            Physical Exam    Airway        Mallampati: II   TM distance: > 3 FB   Neck ROM: full   Mouth opening: > 3 cm    Respiratory Devices and Support         Dental  no notable dental history         Cardiovascular   cardiovascular exam normal       Rhythm and rate:  regular and normal     Pulmonary   pulmonary exam normal        breath sounds clear to auscultation           OUTSIDE LABS:  CBC:   CBC RESULTS: Recent Labs   Lab Test 06/15/22  1756 12/15/21  1618   WBC 5.8 4.7   HGB 14.9 13.3   HCT 45.7 39.4   * 134*     Last Comprehensive Metabolic Panel:  Recent Labs   Lab Test 09/19/22  0621 06/15/22  1756 03/16/22  1632 12/23/21  1852 12/08/21  1640   NA  --  138 137 129* 134*   POTASSIUM  --  3.8 3.8 4.0 4.4   CHLORIDE  --  99 97* 94* 98   CO2  --  27 29 22 22   ANIONGAP  --  12 11 13 14   BUN  --  27 19 22 22   CR  --  0.99 0.82 1.27* 1.04   GFRESTIMATED  --  55* 69 41* 49*   * 103 97 117 88   DIONE  --  9.1 9.1 9.1 8.8        Lab Results   Component Value Date    WBC 5.8 06/15/2022    WBC 4.7 12/15/2021    HGB 14.9 06/15/2022    HGB 13.3 12/15/2021    HCT 45.7 06/15/2022    HCT 39.4 12/15/2021     (L) 06/15/2022     (L) 12/15/2021     BMP:   Lab Results   Component Value Date     06/15/2022     03/16/2022    POTASSIUM 3.8 06/15/2022    POTASSIUM 3.8 03/16/2022    CHLORIDE 99 06/15/2022    CHLORIDE 97 (L) 03/16/2022    CO2 27 06/15/2022    CO2 29 03/16/2022    BUN 27 06/15/2022    BUN 19 03/16/2022    CR 0.99 06/15/2022    CR 0.82 03/16/2022     (H) 09/19/2022     06/15/2022     COAGS: No results found for: PTT, INR, FIBR  POC: No results found for: BGM, HCG, HCGS  HEPATIC:   Lab Results   Component Value Date    ALBUMIN 3.7 12/23/2021    PROTTOTAL 6.1 12/23/2021    ALT <9 12/23/2021    AST 21 12/23/2021    ALKPHOS 46 12/23/2021    BILITOTAL 1.1 (H) 12/23/2021     OTHER:   Lab Results   Component Value Date    DIONE 9.1 06/15/2022    TSH 2.66 12/23/2021       Anesthesia Plan    ASA Status:  3   NPO Status:  NPO Appropriate    Anesthesia Type: MAC.     - Reason for MAC: chronic cardiopulmonary disease, straight local not clinically adequate              Consents    Anesthesia Plan(s) and associated risks, benefits, and  realistic alternatives discussed. Questions answered and patient/representative(s) expressed understanding.    - Discussed:     - Discussed with:  Patient      - Extended Intubation/Ventilatory Support Discussed: No.      - Patient is DNR/DNI Status: No    Use of blood products discussed: No .     Postoperative Care    Pain management: IV analgesics, Oral pain medications, Multi-modal analgesia.        Comments:                Shaunna Berrios MD

## 2022-09-19 NOTE — DISCHARGE INSTRUCTIONS
Dr. Doty  [facility]  [phone]  Post Operative Cataract Instructions    If you have a gauze eye patch on, please do not remove it until it is removed by your physician at your first post-operative visit.  You will start your eye drops the next day.    Wear the clear eye shield when sleeping for protection for 5 days.    Do not rub the operated eye.    Light sensitivity may be noticed. Sunglasses may be worn for comfort.    Some discomfort and irritation may be noticed. Acetaminophen (Tylenol) or Ibuprofen (Advil) may be taken for discomfort. If pain persists please call Dr. Doty's office.    Keep the operated eye dry. You may wash your hair, bathe or shower, but keep the operated eye closed while doing so.     If you take glaucoma medications, bring them with you to the clinic on your first post operative visit.    Bring your prescribed eye drops with you to your scheduled post-operative appointment.    Use medication exactly as prescribed by your doctor. You may restart your regular home medications.     Call Dr. Doty's office at [phone] if any of the following should occur:    Any sudden vision changes, including decreased vision  Nausea or severe headache  Increase in pain not controlled  Signs of infection (pus, increasing redness or tenderness)  Severe sensitivity to light      Same-Day Surgery   Adult Discharge Orders & Instructions     For 24 hours after surgery:  Get plenty of rest.  A responsible adult must stay with you for at least 24 hours after you leave the hospital.   Pain medication can slow your reflexes. Do not drive or use heavy equipment.  If you have weakness or tingling, don't drive or use heavy equipment until this feeling goes away.  Mixing alcohol and pain medication can cause dizziness and slow your breathing. It can even be fatal. Do not drink alcohol while taking pain medication.  Avoid strenuous or risky activities.  Ask for help when climbing stairs.   You  may feel lightheaded.  If so, sit for a few minutes before standing.  Have someone help you get up.   If you have nausea (feel sick to your stomach), drink only clear liquids such as apple juice, ginger ale, broth or 7-Up.  Rest may also help.  Be sure to drink enough fluids.  Move to a regular diet as you feel able. Take pain medications with a small amount of solid food, such as toast or crackers, to avoid nausea.   A slight fever is normal. Call the doctor if your fever is over 100 F (37.7 C) (taken under the tongue) or lasts longer than 24 hours.  You may have a dry mouth, muscle aches, trouble sleeping or a sore throat.  These symptoms should go away after 24 hours.  Do not make important or legal decisions.   Pain Management:      1. Take pain medication (if prescribed) for pain as directed by your physician.        2. WARNING: If the pain medication you have been prescribed contains Tylenol  (acetaminophen), DO NOT take additional doses of Tylenol (acetaminophen).     Call your doctor for any of the followin.  Signs of infection (fever, growing tenderness at the surgery site, severe pain, a large amount of drainage or bleeding, foul-smelling drainage, redness, swelling).    2.  It has been over 8 to 10 hours since surgery and you are still not able to urinate (pee).    3.  Headache for over 24 hours.    4.  Numbness, tingling or weakness the day after surgery (if you had spinal anesthesia).  To contact a doctor, call ______Dr. Fuentes (Lalitha), Ophthalmology [phone] __________ or:  '   [phone] and ask for the Resident On Call for:          ________opthamology_____ (answered 24 hours a day)  '   Emergency Department:  [place] [facility]: [phone]  [place] [facility]: [phone]               Rev. 10/2014

## 2022-09-19 NOTE — BRIEF OP NOTE
[facility]    Brief Operative Note    Pre-operative diagnosis: Combined forms of age-related cataract, bilateral [H25.813]  Post-operative diagnosis Same as pre-operative diagnosis    Procedure: Procedure(s):  PHACOEMULSIFICATION, CATARACT, WITH INTRAOCULAR LENS IMPLANT LEFT  Surgeon: Surgeon(s) and Role:     * Contreras Feldman MD - Primary  Anesthesia: MAC   Estimated Blood Loss: None    Drains: None  Specimens: * No specimens in log *  Findings:   None.  Complications: None.  Implants:   Implant Name Type Inv. Item Serial No.  Lot No. LRB No. Used Action   EYE IMP IOL ROSARIO ACRYSOF UV SA60WF 17.5D - [id number] Lens/Eye Implant EYE IMP IOL ROSARIO ACRYSOF UV SA60WF 17.5D [id number] [employer] [facility]S  Left 1 Implanted

## 2022-09-19 NOTE — OP NOTE
SURGEON: LAKSHMI Doty MD   ASSISTANT SURGEON: KELLY Lemus MD  PREOPERATIVE DIAGNOSIS: Visually significant cataract LEFT eye  POSTOPERATIVE DIAGNOSIS: same  PROCEDURE: Phacoemulsification with intraocular lens implantation  ANESTHESIA: Monitored anesthesia care and topical anesthesia    COMPLICATIONS: none    Indication: Leola Bui is a 86 year old with diagnosis of visually significant cataract, here for cataract surgery    DESCRIPTION OF THE PROCEDURE:  The patient was taken to the operative room where topical anesthesia was given with tetracaine and intracameral 1% lidocaine.      The operative eye was prepped and draped in the usual sterile surgical fashion for ophthalmic surgery, including the installation of one drop of 5% Povidone Iodine.  A sterile drape was placed over the face and body and a lid speculum was inserted.      With the use of a Supersharp blade and 0.12 forceps, a paracentesis was created at the limbus. Lidocaine 1% solution was placed in the anterior chamber. Viscoelastic was injected into the anterior chamber using a canula.  A 2.4 mm keratome was then used to construct a clear corneal incision at the 10 o'clock position.  Using Utrata forceps and cystotome needle, a continuous curvilinear capsulorrhexis was created and hydrodissection was undertaken with the use of BSS.  The nucleus was found to be freely mobile and then removed by phacoemulsification using a divide and conquer technique.  The remaining elements of cortex were then removed with irrigation/aspiration.  An IOL,was injected into the capsular bag and was rotated into a good position with a Sinskey hook. The remaining elements of viscoelastic were then removed with irrigation/aspiration. The wounds were hydrated and were watertight.    The lid speculum was removed. Intracameral Moxifloxacin was administered. The eye was cleaned with wet and dry gauze. A clear shield was placed over the eye.  The patient was discharge in stable  condition having tolerated the procedure well.    Implant Name Type Inv. Item Serial No.  Lot No. LRB No. Used Action   EYE IMP IOL ROSARIO ACRYSOF UV SA60WF 17.5D - [id number] Lens/Eye Implant EYE IMP IOL ROSARIO ACRYSOF UV SA60WF 17.5D [id number] [employer] [facility]S  Left 1 Implanted

## 2022-09-20 ENCOUNTER — DOCUMENTATION ONLY (OUTPATIENT)
Dept: OPHTHALMOLOGY | Facility: CLINIC | Age: 87
End: 2022-09-20

## 2022-09-20 NOTE — TELEPHONE ENCOUNTER
After speaking with pt's daughter this AM, she was able to confirm that the phone lines were down at the pt's facility and obtained an email address ([email or web address]) to send the instructions to the nurse at the facility.    This was completed successfully with Dr. Doty's updated note today.    SHAKIR Saini 9:41 AM September 20, 2022

## 2022-09-20 NOTE — TELEPHONE ENCOUNTER
Attempted to send requested letter with eyedrops yesterday 9/19/22. Received two notices that the fax had failed. Unable to reach anyone by phone (busy signal) at the [facility], even with trying their generic mainline listed on the website.    SHAKIR Saini 8:33 AM September 20, 2022

## 2022-09-20 NOTE — PROGRESS NOTES
I talked with Bridget, Moriah's daughter, on phone. We went over the drops instructions.     Moriah will use her drops as below:    Prednisolone Acetate 1% Four times a day left eye   Ketorolac (gray top) four times a day left eye   Moxifloxacin (tan top) four times a day left eye     Wait 5 minutes in between drops    We will fax the above instruction to her nursing facility.    Contreras Doty MD

## 2022-09-28 ENCOUNTER — OFFICE VISIT (OUTPATIENT)
Dept: OPHTHALMOLOGY | Facility: CLINIC | Age: 87
End: 2022-09-28
Attending: OPHTHALMOLOGY
Payer: MEDICARE

## 2022-09-28 DIAGNOSIS — Z48.810 AFTERCARE FOLLOWING SURGERY OF A SENSE ORGAN: Primary | ICD-10-CM

## 2022-09-28 PROCEDURE — 99024 POSTOP FOLLOW-UP VISIT: CPT | Performed by: OPHTHALMOLOGY

## 2022-09-28 PROCEDURE — G0463 HOSPITAL OUTPT CLINIC VISIT: HCPCS

## 2022-09-28 ASSESSMENT — REFRACTION_WEARINGRX
OD_ADD: +2.75
OS_CYLINDER: +1.50
OS_ADD: +2.75
OD_CYLINDER: +1.25
OS_SPHERE: -5.25
OD_SPHERE: -5.50
OS_AXIS: 172
SPECS_TYPE: TRIFOCAL
OD_AXIS: 005

## 2022-09-28 ASSESSMENT — VISUAL ACUITY
METHOD: SNELLEN - LINEAR
OS_SC: 20/40
OD_CC: 20/150
OS_SC+: -2

## 2022-09-28 ASSESSMENT — TONOMETRY
OS_IOP_MMHG: 13
OD_IOP_MMHG: 14
IOP_METHOD: ICARE

## 2022-09-28 ASSESSMENT — EXTERNAL EXAM - RIGHT EYE: OD_EXAM: NORMAL

## 2022-09-28 ASSESSMENT — EXTERNAL EXAM - LEFT EYE: OS_EXAM: NORMAL

## 2022-09-28 NOTE — PROGRESS NOTES
Postoperative status post CE IOL left eye 9/19/2022    Slept well  Doing well    Plan:  No heavy lifting   Jain shield at all times  Retina detachment and endophthalmitis precautions were discussed with the patient and was asked to return if any of the those occur    Medications to operative eye  Stop moxifloxacin  Taper Ketorolac and prednisolone to 3 times a day for one week, then two times a day for one week then once a day for one week then stop      Next week for right eye CE IOL     Complete documentation of historical and exam elements from today's encounter can be found in the full encounter summary report (not reduplicated in this progress note). I personally obtained the chief complaint(s) and history of present illness.  I confirmed and edited as necessary the review of systems, past medical/surgical history, family history, social history, and examination findings as documented by others; and I examined the patient myself. I personally reviewed the relevant tests, images, and reports as documented above. I formulated and edited as necessary the assessment and plan and discussed the findings and management plan with the patient and family.    Contreras Doty MD  , Vitreoretinal surgery   Department of Ophthalmology  [facility]

## 2022-09-28 NOTE — NURSING NOTE
Chief Complaints and History of Present Illnesses   Patient presents with     Post Op (Ophthalmology) Left Eye     Chief Complaint(s) and History of Present Illness(es)     Post Op (Ophthalmology) Left Eye     Laterality: left eye    Onset: 1 week ago              Comments     1 week s/p CE/IOL LE-Pt. States that VA LE is improved but still blurry. No pain BE. No flashes or floaters BE.   Patt Lemons COT 10:48 AM September 28, 2022

## 2022-09-28 NOTE — PATIENT INSTRUCTIONS
Stop moxifloxacin  Taper Ketorolac and prednisolone to 3 times a day for one week, then two times a day for one week then once a day for one week then stop

## 2022-09-30 NOTE — OR NURSING
Discussed TLD and NPO with daughter, also reminded not to take any supplements, vitamins and to bring any inhalers or eye drops patient may have.

## 2022-10-03 ENCOUNTER — ANESTHESIA EVENT (OUTPATIENT)
Dept: SURGERY | Facility: CLINIC | Age: 87
End: 2022-10-03
Payer: MEDICARE

## 2022-10-03 ENCOUNTER — ANESTHESIA (OUTPATIENT)
Dept: SURGERY | Facility: CLINIC | Age: 87
End: 2022-10-03
Payer: MEDICARE

## 2022-10-03 ENCOUNTER — DOCUMENTATION ONLY (OUTPATIENT)
Dept: OTHER | Facility: CLINIC | Age: 87
End: 2022-10-03

## 2022-10-03 ENCOUNTER — HOSPITAL ENCOUNTER (OUTPATIENT)
Facility: CLINIC | Age: 87
Discharge: NURSING, REHAB OR LONG-TERM CARE | End: 2022-10-03
Attending: OPHTHALMOLOGY | Admitting: OPHTHALMOLOGY
Payer: MEDICARE

## 2022-10-03 VITALS
HEART RATE: 85 BPM | OXYGEN SATURATION: 98 % | HEIGHT: 66 IN | WEIGHT: 154.9 LBS | BODY MASS INDEX: 24.89 KG/M2 | DIASTOLIC BLOOD PRESSURE: 115 MMHG | TEMPERATURE: 97.9 F | SYSTOLIC BLOOD PRESSURE: 181 MMHG | RESPIRATION RATE: 20 BRPM

## 2022-10-03 DIAGNOSIS — H25.811 COMBINED FORMS OF AGE-RELATED CATARACT OF RIGHT EYE: Primary | ICD-10-CM

## 2022-10-03 LAB
CREAT SERPL-MCNC: 0.74 MG/DL (ref 0.52–1.04)
GFR SERPL CREATININE-BSD FRML MDRD: 78 ML/MIN/1.73M2
GLUCOSE BLDC GLUCOMTR-MCNC: 131 MG/DL (ref 70–99)
POTASSIUM BLD-SCNC: 3.7 MMOL/L (ref 3.4–5.3)

## 2022-10-03 PROCEDURE — 999N000001 HC CANCELLED SURGERY UP TO 15 MINS: Performed by: OPHTHALMOLOGY

## 2022-10-03 PROCEDURE — 84132 ASSAY OF SERUM POTASSIUM: CPT | Performed by: ANESTHESIOLOGY

## 2022-10-03 PROCEDURE — 999N000141 HC STATISTIC PRE-PROCEDURE NURSING ASSESSMENT: Performed by: OPHTHALMOLOGY

## 2022-10-03 PROCEDURE — 93010 ELECTROCARDIOGRAM REPORT: CPT | Performed by: INTERNAL MEDICINE

## 2022-10-03 PROCEDURE — 250N000009 HC RX 250: Performed by: STUDENT IN AN ORGANIZED HEALTH CARE EDUCATION/TRAINING PROGRAM

## 2022-10-03 PROCEDURE — 82962 GLUCOSE BLOOD TEST: CPT

## 2022-10-03 PROCEDURE — 82565 ASSAY OF CREATININE: CPT | Performed by: ANESTHESIOLOGY

## 2022-10-03 RX ORDER — OXYCODONE HYDROCHLORIDE 5 MG/1
5 TABLET ORAL EVERY 4 HOURS PRN
Status: CANCELLED | OUTPATIENT
Start: 2022-10-03

## 2022-10-03 RX ORDER — LIDOCAINE 40 MG/G
CREAM TOPICAL
Status: DISCONTINUED | OUTPATIENT
Start: 2022-10-03 | End: 2022-10-03 | Stop reason: HOSPADM

## 2022-10-03 RX ORDER — MEPERIDINE HYDROCHLORIDE 25 MG/ML
12.5 INJECTION INTRAMUSCULAR; INTRAVENOUS; SUBCUTANEOUS
Status: CANCELLED | OUTPATIENT
Start: 2022-10-03

## 2022-10-03 RX ORDER — SODIUM CHLORIDE, SODIUM LACTATE, POTASSIUM CHLORIDE, CALCIUM CHLORIDE 600; 310; 30; 20 MG/100ML; MG/100ML; MG/100ML; MG/100ML
INJECTION, SOLUTION INTRAVENOUS CONTINUOUS
Status: CANCELLED | OUTPATIENT
Start: 2022-10-03

## 2022-10-03 RX ORDER — HYDROMORPHONE HCL IN WATER/PF 6 MG/30 ML
0.2 PATIENT CONTROLLED ANALGESIA SYRINGE INTRAVENOUS EVERY 5 MIN PRN
Status: CANCELLED | OUTPATIENT
Start: 2022-10-03

## 2022-10-03 RX ORDER — CYCLOPENTOLAT/TROPIC/PHENYLEPH 1%-1%-2.5%
1 DROPS (EA) OPHTHALMIC (EYE)
Status: DISCONTINUED | OUTPATIENT
Start: 2022-10-03 | End: 2022-10-03 | Stop reason: HOSPADM

## 2022-10-03 RX ORDER — ONDANSETRON 4 MG/1
4 TABLET, ORALLY DISINTEGRATING ORAL EVERY 30 MIN PRN
Status: CANCELLED | OUTPATIENT
Start: 2022-10-03

## 2022-10-03 RX ORDER — FENTANYL CITRATE 50 UG/ML
25 INJECTION, SOLUTION INTRAMUSCULAR; INTRAVENOUS
Status: CANCELLED | OUTPATIENT
Start: 2022-10-03

## 2022-10-03 RX ORDER — FENTANYL CITRATE 50 UG/ML
25 INJECTION, SOLUTION INTRAMUSCULAR; INTRAVENOUS EVERY 5 MIN PRN
Status: CANCELLED | OUTPATIENT
Start: 2022-10-03

## 2022-10-03 RX ORDER — ONDANSETRON 2 MG/ML
4 INJECTION INTRAMUSCULAR; INTRAVENOUS EVERY 30 MIN PRN
Status: CANCELLED | OUTPATIENT
Start: 2022-10-03

## 2022-10-03 RX ORDER — PROPARACAINE HYDROCHLORIDE 5 MG/ML
1 SOLUTION/ DROPS OPHTHALMIC ONCE
Status: COMPLETED | OUTPATIENT
Start: 2022-10-03 | End: 2022-10-03

## 2022-10-03 RX ADMIN — PROPARACAINE HYDROCHLORIDE 1 DROP: 5 SOLUTION/ DROPS OPHTHALMIC at 12:19

## 2022-10-03 RX ADMIN — Medication 1 DROP: at 12:26

## 2022-10-03 ASSESSMENT — ACTIVITIES OF DAILY LIVING (ADL)
ADLS_ACUITY_SCORE: 43
ADLS_ACUITY_SCORE: 35

## 2022-10-03 NOTE — ANESTHESIA PREPROCEDURE EVALUATION
Anesthesia Pre-Procedure Evaluation    Patient: Leola Bui   MRN: [id number] : 1935        Procedure : Procedure(s):  PHACOEMULSIFICATION, CATARACT, WITH INTRAOCULAR LENS IMPLANT RIGHT          Past Medical History:   Diagnosis Date     Acute diastolic congestive heart failure (H)      Anxiety      Breast cancer (H)      CKD (chronic kidney disease) stage 3, GFR 30-59 ml/min (H)      Essential hypertension      Gout      Osteoporosis      Parkinson disease (H)       Past Surgical History:   Procedure Laterality Date     MASTECTOMY       PHACOEMULSIFICATION CLEAR CORNEA WITH STANDARD INTRAOCULAR LENS IMPLANT Left 2022    Procedure: PHACOEMULSIFICATION, CATARACT, WITH INTRAOCULAR LENS IMPLANT LEFT;  Surgeon: Contreras Feldman MD;  Location: [facility]      Allergies   Allergen Reactions     Erythromycin Hives and Rash     Penicillins Hives, Itching and Rash     Sulfa Drugs Hives and Rash     Amlodipine      Other reaction(s): *Unknown     Carvedilol      Other reaction(s): Syncope     Clonidine      Other reaction(s): *Unknown     Diltiazem      Other reaction(s): *Unknown     Flagyl [Metronidazole]      Hydrochlorothiazide      Other reaction(s): *Unknown     Lisinopril       Social History     Tobacco Use     Smoking status: Never Smoker     Smokeless tobacco: Never Used   Substance Use Topics     Alcohol use: Not on file      Wt Readings from Last 1 Encounters:   10/03/22 70.3 kg (154 lb 14.4 oz)        Anesthesia Evaluation   Pt has had prior anesthetic.         ROS/MED HX  ENT/Pulmonary:       Neurologic:       Cardiovascular:     (+) hypertension-----CHF     METS/Exercise Tolerance:     Hematologic:       Musculoskeletal:       GI/Hepatic:       Renal/Genitourinary:     (+) renal disease,     Endo:       Psychiatric/Substance Use:       Infectious Disease:       Malignancy:       Other:            Physical Exam    Airway        Mallampati: II   TM distance: > 3 FB   Neck ROM: full   Mouth  opening: > 3 cm    Respiratory Devices and Support         Dental  no notable dental history         Cardiovascular   cardiovascular exam normal          Pulmonary   pulmonary exam normal                OUTSIDE LABS:  CBC:   Lab Results   Component Value Date    WBC 5.8 06/15/2022    WBC 4.7 12/15/2021    HGB 14.9 06/15/2022    HGB 13.3 12/15/2021    HCT 45.7 06/15/2022    HCT 39.4 12/15/2021     (L) 06/15/2022     (L) 12/15/2021     BMP:   Lab Results   Component Value Date     06/15/2022     03/16/2022    POTASSIUM 3.8 06/15/2022    POTASSIUM 3.8 03/16/2022    CHLORIDE 99 06/15/2022    CHLORIDE 97 (L) 03/16/2022    CO2 27 06/15/2022    CO2 29 03/16/2022    BUN 27 06/15/2022    BUN 19 03/16/2022    CR 0.99 06/15/2022    CR 0.82 03/16/2022     (H) 10/03/2022     (H) 09/19/2022     COAGS: No results found for: PTT, INR, FIBR  POC: No results found for: BGM, HCG, HCGS  HEPATIC:   Lab Results   Component Value Date    ALBUMIN 3.7 12/23/2021    PROTTOTAL 6.1 12/23/2021    ALT <9 12/23/2021    AST 21 12/23/2021    ALKPHOS 46 12/23/2021    BILITOTAL 1.1 (H) 12/23/2021     OTHER:   Lab Results   Component Value Date    DIONE 9.1 06/15/2022    TSH 2.66 12/23/2021       Anesthesia Plan    ASA Status:  4   NPO Status:  NPO Appropriate    Anesthesia Type: MAC.              Consents    Anesthesia Plan(s) and associated risks, benefits, and realistic alternatives discussed. Questions answered and patient/representative(s) expressed understanding.    - Discussed:     - Discussed with:  Patient      - Extended Intubation/Ventilatory Support Discussed: No.      - Patient is DNR/DNI Status: No    Use of blood products discussed: No .     Postoperative Care            Comments:    Other Comments: Case cancelled due to uncontrolled BP. Diastolic pressure consistently above 112. Patient is very sleepy- hardly stay a wake for 5 minutes. Surgeon offered option of doing it under straight local but he  is not comfortable.             Myrtle Gordon MD

## 2022-10-03 NOTE — OR NURSING
Anesthesia notified of patient's elevated BP.  Dr. Lyles at bedside to evaluate patient and discuss with surgeon.

## 2022-10-03 NOTE — OR NURSING
Per Dr. Lyles and Dr. Doty, pt case cancelled secondary to elevated BPs.  Pt daughter instructed to follow up with primary care provider and obtain a referral to cardiology.      Total of 60 minutes of patient facing time.

## 2022-10-06 LAB
ATRIAL RATE - MUSE: 89 BPM
DIASTOLIC BLOOD PRESSURE - MUSE: NORMAL MMHG
INTERPRETATION ECG - MUSE: NORMAL
P AXIS - MUSE: NORMAL DEGREES
PR INTERVAL - MUSE: NORMAL MS
QRS DURATION - MUSE: 82 MS
QT - MUSE: 396 MS
QTC - MUSE: 439 MS
R AXIS - MUSE: 2 DEGREES
SYSTOLIC BLOOD PRESSURE - MUSE: NORMAL MMHG
T AXIS - MUSE: 21 DEGREES
VENTRICULAR RATE- MUSE: 74 BPM

## 2022-10-10 ENCOUNTER — TELEPHONE (OUTPATIENT)
Dept: OPHTHALMOLOGY | Facility: CLINIC | Age: 87
End: 2022-10-10

## 2022-10-10 NOTE — TELEPHONE ENCOUNTER
Returned Sonja's call to cancel her post-op scheduled for 10/12 since Leola's procedure was canceled due to high blood pressure. I told Sonja I would reach out to the team to see how soon we can reschedule and ask for a new case request.

## 2022-10-12 ENCOUNTER — LAB REQUISITION (OUTPATIENT)
Dept: LAB | Facility: CLINIC | Age: 87
End: 2022-10-12
Payer: MEDICARE

## 2022-10-12 DIAGNOSIS — Z79.899 OTHER LONG TERM (CURRENT) DRUG THERAPY: ICD-10-CM

## 2022-10-14 NOTE — TELEPHONE ENCOUNTER
Syed Sharp. I called and talked with Sonja. Her mom is getting better blood pressure meds and might be ready for surgery in a few weeks. Would you please call Sonja Price in 2 weeks to see what date works for them for surgery? Then I can place a case request for her. Thank you!

## 2022-10-19 LAB
ANION GAP SERPL CALCULATED.3IONS-SCNC: 15 MMOL/L (ref 7–15)
BUN SERPL-MCNC: 21.3 MG/DL (ref 8–23)
CALCIUM SERPL-MCNC: 9.5 MG/DL (ref 8.8–10.2)
CHLORIDE SERPL-SCNC: 96 MMOL/L (ref 98–107)
CREAT SERPL-MCNC: 0.91 MG/DL (ref 0.51–0.95)
DEPRECATED HCO3 PLAS-SCNC: 24 MMOL/L (ref 22–29)
GFR SERPL CREATININE-BSD FRML MDRD: 61 ML/MIN/1.73M2
GLUCOSE SERPL-MCNC: 88 MG/DL (ref 70–99)
POTASSIUM SERPL-SCNC: 3.6 MMOL/L (ref 3.4–5.3)
SODIUM SERPL-SCNC: 135 MMOL/L (ref 136–145)

## 2022-10-19 PROCEDURE — P9604 ONE-WAY ALLOW PRORATED TRIP: HCPCS | Mod: ORL | Performed by: NURSE PRACTITIONER

## 2022-10-19 PROCEDURE — 82310 ASSAY OF CALCIUM: CPT | Mod: ORL | Performed by: NURSE PRACTITIONER

## 2022-10-19 PROCEDURE — P9604 ONE-WAY ALLOW PRORATED TRIP: HCPCS | Mod: ORL | Performed by: GENERAL ACUTE CARE HOSPITAL

## 2022-10-19 PROCEDURE — 36415 COLL VENOUS BLD VENIPUNCTURE: CPT | Mod: ORL | Performed by: GENERAL ACUTE CARE HOSPITAL

## 2022-10-19 PROCEDURE — 80048 BASIC METABOLIC PNL TOTAL CA: CPT | Mod: ORL | Performed by: GENERAL ACUTE CARE HOSPITAL

## 2022-10-19 PROCEDURE — 36415 COLL VENOUS BLD VENIPUNCTURE: CPT | Mod: ORL | Performed by: NURSE PRACTITIONER

## 2022-11-23 ENCOUNTER — LAB REQUISITION (OUTPATIENT)
Dept: LAB | Facility: CLINIC | Age: 87
End: 2022-11-23
Payer: MEDICARE

## 2022-11-23 DIAGNOSIS — Z00.00 ENCOUNTER FOR GENERAL ADULT MEDICAL EXAMINATION WITHOUT ABNORMAL FINDINGS: ICD-10-CM

## 2022-11-23 LAB
ALBUMIN UR-MCNC: NEGATIVE MG/DL
APPEARANCE UR: CLEAR
BILIRUB UR QL STRIP: NEGATIVE
CAOX CRY #/AREA URNS HPF: ABNORMAL /HPF
COLOR UR AUTO: ABNORMAL
GLUCOSE UR STRIP-MCNC: NEGATIVE MG/DL
HGB UR QL STRIP: ABNORMAL
HYALINE CASTS: 3 /LPF
KETONES UR STRIP-MCNC: NEGATIVE MG/DL
LEUKOCYTE ESTERASE UR QL STRIP: ABNORMAL
MUCOUS THREADS #/AREA URNS LPF: PRESENT /LPF
NITRATE UR QL: NEGATIVE
PH UR STRIP: 6 [PH] (ref 5–7)
RBC URINE: 2 /HPF
SP GR UR STRIP: 1.01 (ref 1–1.03)
SQUAMOUS EPITHELIAL: 1 /HPF
TRANSITIONAL EPI: 5 /HPF
UROBILINOGEN UR STRIP-MCNC: NORMAL MG/DL
WBC URINE: 18 /HPF

## 2022-11-23 PROCEDURE — 87086 URINE CULTURE/COLONY COUNT: CPT | Mod: ORL | Performed by: NURSE PRACTITIONER

## 2022-11-23 PROCEDURE — 81001 URINALYSIS AUTO W/SCOPE: CPT | Mod: ORL | Performed by: NURSE PRACTITIONER

## 2022-11-25 LAB — BACTERIA UR CULT: NORMAL

## 2022-12-08 ENCOUNTER — TELEPHONE (OUTPATIENT)
Dept: OPHTHALMOLOGY | Facility: CLINIC | Age: 87
End: 2022-12-08

## 2022-12-08 DIAGNOSIS — H25.11 AGE-RELATED NUCLEAR CATARACT OF RIGHT EYE: Primary | ICD-10-CM

## 2022-12-08 NOTE — TELEPHONE ENCOUNTER
Sonja Price [phone]     Daughter at care facility and has been monitoring blood pressure with new medication.    Pt recently seen by Primary care provider and ok to reschedule cataract surgery that was cancelled in October.    Note to Dr. Doty to assist in ensuring orders in place (and also if clinic appt needed) and surgery scheduler to reach out for scheduling    Reid Kahn RN 9:35 AM 12/08/22        [facility]    Phone Message    May a detailed message be left on voicemail: yes     Reason for Call: Other: Daughter, Sonja, called requesting a call back. She would like to discuss proceeding with cataract surgery. Please call to advise.      Action Taken: Other: Eye    Travel Screening: Not Applicable

## 2022-12-14 ENCOUNTER — TELEPHONE (OUTPATIENT)
Dept: OPHTHALMOLOGY | Facility: CLINIC | Age: 87
End: 2022-12-14

## 2022-12-14 NOTE — TELEPHONE ENCOUNTER
Patient is scheduled for surgery with Dr. Contreras Fuentes     Spoke with: Sonja     Date of Surgery: 12/26/22     Location: [facility]:  [address] [place] [id number]     H&P will be completed at: [facility]     Post Op scheduled on 12/27, and 01/04     Surgery packet was mailed to Sonja Price:  [id number] [place] [address]    Mailed 12/14     Additional comments: Advised RN will call 1 - 3 business days prior with arrival time and instructions. Informed patient they will need an adult  and responsible adult to stay with for 24 hours following surgery

## 2022-12-21 NOTE — OR NURSING
I called pt's daughter Sonja regarding pt's surgery on 12/26. Sonja said her mom lives at [facility]. Sonja said she was planning to ride with the w/c transport on [place] but she is not feeling well so her sister Bridget may be the one accompanying her mom. Sonja said that the provider who was going to to the pre-op H&P from [facility], went into labor an can't do it and no one from [facility] is calling her back. Pt's current surgery time is 1515. Sonja said that will not work. Her mom needs the earliest time because her anxiety gets bad and her BP goes up and last time, she had an afternoon case and it was cancelled due to pt's anxiety. I called Lila Doyle and explained the situation. She said she would change pt's time back to 1st case. I asked her not to change it once its changed to morning because the pt uses [facility]/c transport that requires 2 day notice for time changes. I asked hre if Dr Doty requires pt to hold Eliquis before surgery. She does not know. Lila said she will ask Dr Doty and if Eve needs to be held, she will get an order and contact the Assisted Living nurse. I asked her if Dr Doty can do the pre-op H&P if Brisa is unable to do it. Lila said Dr Doty will do this if needed.

## 2022-12-25 ENCOUNTER — ANESTHESIA EVENT (OUTPATIENT)
Dept: SURGERY | Facility: CLINIC | Age: 87
End: 2022-12-25
Payer: MEDICARE

## 2022-12-26 ENCOUNTER — ANESTHESIA (OUTPATIENT)
Dept: SURGERY | Facility: CLINIC | Age: 87
End: 2022-12-26
Payer: MEDICARE

## 2022-12-26 ENCOUNTER — HOSPITAL ENCOUNTER (OUTPATIENT)
Facility: CLINIC | Age: 87
Discharge: NURSING, REHAB OR LONG-TERM CARE | End: 2022-12-26
Attending: OPHTHALMOLOGY | Admitting: OPHTHALMOLOGY
Payer: MEDICARE

## 2022-12-26 ENCOUNTER — TELEPHONE (OUTPATIENT)
Dept: OPHTHALMOLOGY | Facility: CLINIC | Age: 87
End: 2022-12-26

## 2022-12-26 VITALS
BODY MASS INDEX: 25.26 KG/M2 | TEMPERATURE: 98.2 F | DIASTOLIC BLOOD PRESSURE: 63 MMHG | SYSTOLIC BLOOD PRESSURE: 140 MMHG | HEART RATE: 95 BPM | OXYGEN SATURATION: 94 % | WEIGHT: 157.19 LBS | RESPIRATION RATE: 16 BRPM | HEIGHT: 66 IN

## 2022-12-26 DIAGNOSIS — H25.811 COMBINED FORMS OF AGE-RELATED CATARACT OF RIGHT EYE: Primary | ICD-10-CM

## 2022-12-26 PROCEDURE — 250N000013 HC RX MED GY IP 250 OP 250 PS 637: Performed by: STUDENT IN AN ORGANIZED HEALTH CARE EDUCATION/TRAINING PROGRAM

## 2022-12-26 PROCEDURE — 250N000011 HC RX IP 250 OP 636: Performed by: OPHTHALMOLOGY

## 2022-12-26 PROCEDURE — V2632 POST CHMBR INTRAOCULAR LENS: HCPCS | Performed by: OPHTHALMOLOGY

## 2022-12-26 PROCEDURE — 258N000003 HC RX IP 258 OP 636: Performed by: NURSE ANESTHETIST, CERTIFIED REGISTERED

## 2022-12-26 PROCEDURE — 250N000009 HC RX 250: Performed by: STUDENT IN AN ORGANIZED HEALTH CARE EDUCATION/TRAINING PROGRAM

## 2022-12-26 PROCEDURE — 710N000012 HC RECOVERY PHASE 2, PER MINUTE: Performed by: OPHTHALMOLOGY

## 2022-12-26 PROCEDURE — 250N000011 HC RX IP 250 OP 636: Performed by: NURSE ANESTHETIST, CERTIFIED REGISTERED

## 2022-12-26 PROCEDURE — 250N000009 HC RX 250: Performed by: OPHTHALMOLOGY

## 2022-12-26 PROCEDURE — 370N000017 HC ANESTHESIA TECHNICAL FEE, PER MIN: Performed by: OPHTHALMOLOGY

## 2022-12-26 PROCEDURE — 360N000076 HC SURGERY LEVEL 3, PER MIN: Performed by: OPHTHALMOLOGY

## 2022-12-26 PROCEDURE — 999N000141 HC STATISTIC PRE-PROCEDURE NURSING ASSESSMENT: Performed by: OPHTHALMOLOGY

## 2022-12-26 PROCEDURE — 272N000001 HC OR GENERAL SUPPLY STERILE: Performed by: OPHTHALMOLOGY

## 2022-12-26 PROCEDURE — 66984 XCAPSL CTRC RMVL W/O ECP: CPT | Mod: RT | Performed by: OPHTHALMOLOGY

## 2022-12-26 DEVICE — ACRYSOF(R) ASPHERIC IOL, SINGLE-PIECE ACRYLICFOLDABLE POSTERIOR CHAMBER LENS,UV-ABSORBING, 13.0MM LENGTH, 6.0MM ANTERIORASYMMETRIC BICONVEX OPTIC, PLANAR HAPTICS.
Type: IMPLANTABLE DEVICE | Site: EYE | Status: FUNCTIONAL
Brand: ACRYSOF®

## 2022-12-26 RX ORDER — LIDOCAINE HYDROCHLORIDE 10 MG/ML
INJECTION, SOLUTION EPIDURAL; INFILTRATION; INTRACAUDAL; PERINEURAL PRN
Status: DISCONTINUED | OUTPATIENT
Start: 2022-12-26 | End: 2022-12-26 | Stop reason: HOSPADM

## 2022-12-26 RX ORDER — SODIUM CHLORIDE, SODIUM LACTATE, POTASSIUM CHLORIDE, CALCIUM CHLORIDE 600; 310; 30; 20 MG/100ML; MG/100ML; MG/100ML; MG/100ML
INJECTION, SOLUTION INTRAVENOUS CONTINUOUS
Status: DISCONTINUED | OUTPATIENT
Start: 2022-12-26 | End: 2022-12-26 | Stop reason: HOSPADM

## 2022-12-26 RX ORDER — ATROPINE SULFATE 10 MG/ML
SOLUTION/ DROPS OPHTHALMIC PRN
Status: DISCONTINUED | OUTPATIENT
Start: 2022-12-26 | End: 2022-12-26 | Stop reason: HOSPADM

## 2022-12-26 RX ORDER — MOXIFLOXACIN IN NACL,ISO-OS/PF 1.6 MG/ML
SYRINGE (ML) INTRAOCULAR PRN
Status: DISCONTINUED | OUTPATIENT
Start: 2022-12-26 | End: 2022-12-26 | Stop reason: HOSPADM

## 2022-12-26 RX ORDER — FENTANYL CITRATE 50 UG/ML
50 INJECTION, SOLUTION INTRAMUSCULAR; INTRAVENOUS EVERY 5 MIN PRN
Status: DISCONTINUED | OUTPATIENT
Start: 2022-12-26 | End: 2022-12-26 | Stop reason: HOSPADM

## 2022-12-26 RX ORDER — PROPARACAINE HYDROCHLORIDE 5 MG/ML
1 SOLUTION/ DROPS OPHTHALMIC ONCE
Status: COMPLETED | OUTPATIENT
Start: 2022-12-26 | End: 2022-12-26

## 2022-12-26 RX ORDER — FENTANYL CITRATE 50 UG/ML
25 INJECTION, SOLUTION INTRAMUSCULAR; INTRAVENOUS EVERY 5 MIN PRN
Status: DISCONTINUED | OUTPATIENT
Start: 2022-12-26 | End: 2022-12-26 | Stop reason: HOSPADM

## 2022-12-26 RX ORDER — PREDNISOLONE ACETATE 10 MG/ML
1 SUSPENSION/ DROPS OPHTHALMIC 4 TIMES DAILY
Qty: 5 ML | Refills: 0 | Status: SHIPPED | OUTPATIENT
Start: 2022-12-26 | End: 2024-01-01

## 2022-12-26 RX ORDER — FENTANYL CITRATE 50 UG/ML
25 INJECTION, SOLUTION INTRAMUSCULAR; INTRAVENOUS
Status: DISCONTINUED | OUTPATIENT
Start: 2022-12-26 | End: 2022-12-26 | Stop reason: HOSPADM

## 2022-12-26 RX ORDER — FENTANYL CITRATE 50 UG/ML
INJECTION, SOLUTION INTRAMUSCULAR; INTRAVENOUS PRN
Status: DISCONTINUED | OUTPATIENT
Start: 2022-12-26 | End: 2022-12-26

## 2022-12-26 RX ORDER — LABETALOL HYDROCHLORIDE 5 MG/ML
10 INJECTION, SOLUTION INTRAVENOUS
Status: DISCONTINUED | OUTPATIENT
Start: 2022-12-26 | End: 2022-12-26 | Stop reason: HOSPADM

## 2022-12-26 RX ORDER — ONDANSETRON 2 MG/ML
4 INJECTION INTRAMUSCULAR; INTRAVENOUS EVERY 30 MIN PRN
Status: DISCONTINUED | OUTPATIENT
Start: 2022-12-26 | End: 2022-12-26 | Stop reason: HOSPADM

## 2022-12-26 RX ORDER — SODIUM CHLORIDE, SODIUM LACTATE, POTASSIUM CHLORIDE, CALCIUM CHLORIDE 600; 310; 30; 20 MG/100ML; MG/100ML; MG/100ML; MG/100ML
INJECTION, SOLUTION INTRAVENOUS CONTINUOUS PRN
Status: DISCONTINUED | OUTPATIENT
Start: 2022-12-26 | End: 2022-12-26

## 2022-12-26 RX ORDER — LABETALOL 20 MG/4 ML (5 MG/ML) INTRAVENOUS SYRINGE
PRN
Status: DISCONTINUED | OUTPATIENT
Start: 2022-12-26 | End: 2022-12-26

## 2022-12-26 RX ORDER — BALANCED SALT SOLUTION 6.4; .75; .48; .3; 3.9; 1.7 MG/ML; MG/ML; MG/ML; MG/ML; MG/ML; MG/ML
SOLUTION OPHTHALMIC PRN
Status: DISCONTINUED | OUTPATIENT
Start: 2022-12-26 | End: 2022-12-26 | Stop reason: HOSPADM

## 2022-12-26 RX ORDER — HYDROMORPHONE HYDROCHLORIDE 1 MG/ML
0.4 INJECTION, SOLUTION INTRAMUSCULAR; INTRAVENOUS; SUBCUTANEOUS EVERY 5 MIN PRN
Status: DISCONTINUED | OUTPATIENT
Start: 2022-12-26 | End: 2022-12-26 | Stop reason: HOSPADM

## 2022-12-26 RX ORDER — KETOROLAC TROMETHAMINE 5 MG/ML
1 SOLUTION OPHTHALMIC 4 TIMES DAILY
Qty: 5 ML | Refills: 0 | Status: SHIPPED | OUTPATIENT
Start: 2022-12-26 | End: 2024-01-01

## 2022-12-26 RX ORDER — HYDRALAZINE HYDROCHLORIDE 20 MG/ML
2.5-5 INJECTION INTRAMUSCULAR; INTRAVENOUS EVERY 10 MIN PRN
Status: DISCONTINUED | OUTPATIENT
Start: 2022-12-26 | End: 2022-12-26 | Stop reason: HOSPADM

## 2022-12-26 RX ORDER — OFLOXACIN 3 MG/ML
1 SOLUTION/ DROPS OPHTHALMIC 4 TIMES DAILY
Qty: 5 ML | Refills: 0 | Status: SHIPPED | OUTPATIENT
Start: 2022-12-26 | End: 2024-01-01

## 2022-12-26 RX ORDER — ONDANSETRON 4 MG/1
4 TABLET, ORALLY DISINTEGRATING ORAL EVERY 30 MIN PRN
Status: DISCONTINUED | OUTPATIENT
Start: 2022-12-26 | End: 2022-12-26 | Stop reason: HOSPADM

## 2022-12-26 RX ORDER — MEPERIDINE HYDROCHLORIDE 25 MG/ML
12.5 INJECTION INTRAMUSCULAR; INTRAVENOUS; SUBCUTANEOUS
Status: DISCONTINUED | OUTPATIENT
Start: 2022-12-26 | End: 2022-12-26 | Stop reason: HOSPADM

## 2022-12-26 RX ORDER — HYDROMORPHONE HYDROCHLORIDE 1 MG/ML
0.2 INJECTION, SOLUTION INTRAMUSCULAR; INTRAVENOUS; SUBCUTANEOUS EVERY 5 MIN PRN
Status: DISCONTINUED | OUTPATIENT
Start: 2022-12-26 | End: 2022-12-26 | Stop reason: HOSPADM

## 2022-12-26 RX ORDER — CYCLOPENTOLAT/TROPIC/PHENYLEPH 1%-1%-2.5%
1 DROPS (EA) OPHTHALMIC (EYE)
Status: COMPLETED | OUTPATIENT
Start: 2022-12-26 | End: 2022-12-26

## 2022-12-26 RX ORDER — ACETAMINOPHEN 325 MG/1
975 TABLET ORAL ONCE
Status: COMPLETED | OUTPATIENT
Start: 2022-12-26 | End: 2022-12-26

## 2022-12-26 RX ORDER — TETRACAINE HYDROCHLORIDE 5 MG/ML
SOLUTION OPHTHALMIC PRN
Status: DISCONTINUED | OUTPATIENT
Start: 2022-12-26 | End: 2022-12-26 | Stop reason: HOSPADM

## 2022-12-26 RX ADMIN — SODIUM CHLORIDE, POTASSIUM CHLORIDE, SODIUM LACTATE AND CALCIUM CHLORIDE: 600; 310; 30; 20 INJECTION, SOLUTION INTRAVENOUS at 08:13

## 2022-12-26 RX ADMIN — ACETAMINOPHEN 975 MG: 325 TABLET, FILM COATED ORAL at 11:59

## 2022-12-26 RX ADMIN — PROPARACAINE HYDROCHLORIDE 1 DROP: 5 SOLUTION/ DROPS OPHTHALMIC at 06:41

## 2022-12-26 RX ADMIN — LABETALOL 20 MG/4 ML (5 MG/ML) INTRAVENOUS SYRINGE 5 MG: at 08:31

## 2022-12-26 RX ADMIN — FENTANYL CITRATE 25 MCG: 50 INJECTION, SOLUTION INTRAMUSCULAR; INTRAVENOUS at 08:30

## 2022-12-26 RX ADMIN — Medication 1 DROP: at 06:39

## 2022-12-26 RX ADMIN — Medication 1 DROP: at 06:52

## 2022-12-26 RX ADMIN — LABETALOL 20 MG/4 ML (5 MG/ML) INTRAVENOUS SYRINGE 5 MG: at 08:36

## 2022-12-26 RX ADMIN — FENTANYL CITRATE 25 MCG: 50 INJECTION, SOLUTION INTRAMUSCULAR; INTRAVENOUS at 09:40

## 2022-12-26 RX ADMIN — LABETALOL 20 MG/4 ML (5 MG/ML) INTRAVENOUS SYRINGE 15 MG: at 08:47

## 2022-12-26 RX ADMIN — LABETALOL 20 MG/4 ML (5 MG/ML) INTRAVENOUS SYRINGE 15 MG: at 08:52

## 2022-12-26 RX ADMIN — Medication 1 DROP: at 06:47

## 2022-12-26 RX ADMIN — LABETALOL 20 MG/4 ML (5 MG/ML) INTRAVENOUS SYRINGE 10 MG: at 08:42

## 2022-12-26 ASSESSMENT — LIFESTYLE VARIABLES: TOBACCO_USE: 0

## 2022-12-26 ASSESSMENT — ACTIVITIES OF DAILY LIVING (ADL)
ADLS_ACUITY_SCORE: 43

## 2022-12-26 NOTE — ANESTHESIA CARE TRANSFER NOTE
Patient: Leola Bui    Procedure: Procedure(s):  PHACOEMULSIFICATION, CATARACT, WITH INTRAOCULAR LENS IMPLANT RIGHT       Diagnosis: Age-related nuclear cataract of right eye [H25.11]  Diagnosis Additional Information: No value filed.    Anesthesia Type:   No value filed.     Note:    Oropharynx: spontaneously breathing and oropharynx clear of all foreign objects  Level of Consciousness: drowsy  Oxygen Supplementation: room air    Independent Airway: airway patency satisfactory and stable  Dentition: dentition unchanged  Vital Signs Stable: post-procedure vital signs reviewed and stable  Report to RN Given: handoff report given  Patient transferred to: Phase II    Handoff Report: Identifed the Patient, Identified the Reponsible Provider, Reviewed the pertinent medical history, Discussed the surgical course, Reviewed Intra-OP anesthesia mangement and issues during anesthesia, Set expectations for post-procedure period and Allowed opportunity for questions and acknowledgement of understanding      Vitals:  Vitals Value Taken Time   /60 12/26/22 1000   Temp     Pulse 88 12/26/22 1000   Resp 16    SpO2 92 % 12/26/22 1004   Vitals shown include unvalidated device data.    Electronically Signed By: Tammy Lauren CRNA, DALE ENNIS  December 26, 2022  10:05 AM

## 2022-12-26 NOTE — ANESTHESIA PREPROCEDURE EVALUATION
Anesthesia Pre-Procedure Evaluation    Patient: Leola Bui   MRN: [id number] : 1935        Procedure : Procedure(s):  PHACOEMULSIFICATION, CATARACT, WITH INTRAOCULAR LENS IMPLANT RIGHT          Past Medical History:   Diagnosis Date     Acute diastolic congestive heart failure (H)      Anxiety      Breast cancer (H)      CKD (chronic kidney disease) stage 3, GFR 30-59 ml/min (H)      Essential hypertension      Gout      Osteoporosis      Parkinson disease (H)      Parkinsons disease (H)       Past Surgical History:   Procedure Laterality Date     MASTECTOMY       PHACOEMULSIFICATION CLEAR CORNEA WITH STANDARD INTRAOCULAR LENS IMPLANT Left 2022    Procedure: PHACOEMULSIFICATION, CATARACT, WITH INTRAOCULAR LENS IMPLANT LEFT;  Surgeon: Contreras Feldman MD;  [facility]      Allergies   Allergen Reactions     Erythromycin Hives and Rash     Penicillins Hives, Itching and Rash     Sulfa Drugs Hives and Rash     Amlodipine      Other reaction(s): *Unknown     Carvedilol      Other reaction(s): Syncope     Clonidine      Other reaction(s): *Unknown     Diltiazem      Other reaction(s): *Unknown     Flagyl [Metronidazole]      Hydrochlorothiazide      Other reaction(s): *Unknown     Lisinopril       Social History     Tobacco Use     Smoking status: Never     Smokeless tobacco: Never   Substance Use Topics     Alcohol use: Not Currently      Wt Readings from Last 1 Encounters:   22 71.3 kg (157 lb 3 oz)        Anesthesia Evaluation   Pt has had prior anesthetic. Type: General and MAC.    No history of anesthetic complications       ROS/MED HX  ENT/Pulmonary:  - neg pulmonary ROS  (-) tobacco use   Neurologic: Comment:   * Parkinson's, on Carbidopa-Levodopa. Presenting features: muscle jerking, stiffness, soft voice, brain fog, impaired mobility.     (+) Parkinson's disease,     Cardiovascular: Comment:   #HTN & CKD, on Furosemide, Losartan, Metoprolol, K supplements  #HFpEF  #Afib, on  Eliquis and Metoprolol.     (+) hypertension-----CHF Last EF: 60-65% date: 2021     METS/Exercise Tolerance:     Hematologic:  - neg hematologic  ROS     Musculoskeletal: Comment:   Osteoporosis  Osteoarthritis  Gout, on allopurinol.        GI/Hepatic:  - neg GI/hepatic ROS     Renal/Genitourinary: Comment:   Impaired renal function. GFR < 60. Not on dialysis.     (+) renal disease, type: CRI, Pt does not require dialysis,     Endo:  - neg endo ROS     Psychiatric/Substance Use:     (+) psychiatric history anxiety and depression     Infectious Disease:  - neg infectious disease ROS     Malignancy: Comment:   Hx/o Breast cancer, s/p mastectomy       Other:  - neg other ROS          Physical Exam    Airway        Mallampati: II   TM distance: > 3 FB   Neck ROM: full   Mouth opening: > 3 cm    Respiratory Devices and Support         Dental  no notable dental history         Cardiovascular   cardiovascular exam normal          Pulmonary   pulmonary exam normal            Other findings: Sedated during this evaluation. Struggles to open eyes to verbal commands but answers or nods yes or no to questions. Daughter bedside andwering for her. Daughter reports patient received one dose of ativan at her assisted living as part of pre-procedural anti-anxiety.     OUTSIDE LABS:  CBC:   Lab Results   Component Value Date    WBC 5.8 06/15/2022    WBC 4.7 12/15/2021    HGB 14.9 06/15/2022    HGB 13.3 12/15/2021    HCT 45.7 06/15/2022    HCT 39.4 12/15/2021     (L) 06/15/2022     (L) 12/15/2021     BMP:   Lab Results   Component Value Date     (L) 10/19/2022     06/15/2022    POTASSIUM 3.6 10/19/2022    POTASSIUM 3.7 10/03/2022    CHLORIDE 96 (L) 10/19/2022    CHLORIDE 99 06/15/2022    CO2 24 10/19/2022    CO2 27 06/15/2022    BUN 21.3 10/19/2022    BUN 27 06/15/2022    CR 0.91 10/19/2022    CR 0.74 10/03/2022    GLC 88 10/19/2022     (H) 10/03/2022     COAGS: No results found for: PTT, INR,  FIBR  POC: No results found for: BGM, HCG, HCGS  HEPATIC:   Lab Results   Component Value Date    ALBUMIN 3.7 12/23/2021    PROTTOTAL 6.1 12/23/2021    ALT <9 12/23/2021    AST 21 12/23/2021    ALKPHOS 46 12/23/2021    BILITOTAL 1.1 (H) 12/23/2021     OTHER:   Lab Results   Component Value Date    DIONE 9.5 10/19/2022    TSH 2.66 12/23/2021       Anesthesia Plan    ASA Status:  3   NPO Status:  NPO Appropriate    Anesthesia Type: MAC.     - Reason for MAC: immobility needed, straight local not clinically adequate              Consents    Anesthesia Plan(s) and associated risks, benefits, and realistic alternatives discussed. Questions answered and patient/representative(s) expressed understanding.    - Discussed:     - Discussed with:  Patient      - Specific Concerns: Plan discussed with patient and patient's daughter. Explained plan for case under light sedation only to avid need for general. .     - Extended Intubation/Ventilatory Support Discussed: No.      - Patient is DNR/DNI Status: No    Use of blood products discussed: No .     Postoperative Care    Pain management: Oral pain medications.   PONV prophylaxis: Ondansetron (or other 5HT-3), Dexamethasone or Solumedrol     Comments:                Jo Guadarrama MD

## 2022-12-26 NOTE — OP NOTE
SURGEON: Contreras Doty MD   ASSISTANT SURGEON: Corrie Lemus MD  PREOPERATIVE DIAGNOSIS: Visually significant cataract right eye  POSTOPERATIVE DIAGNOSIS: same  PROCEDURE: Phacoemulsification with intraocular lens implantation, SN60WF 17.0 D  ANESTHESIA: Monitored anesthesia care    COMPLICATIONS: none    Indication: Leola Bui is a 87 year old with diagnosis of visually significant cataract right eye , here for cataract surgery    DESCRIPTION OF THE PROCEDURE:  The patient was taken to the operative room where intravenous sedation was administered.  The operative eye was prepped and draped in the usual sterile surgical fashion for ophthalmic surgery, including the installation of one drop of 5% Povidone Iodine.  A sterile drape was placed over the face and body and a lid speculum was inserted.      With the use of a Supersharp blade and 0.12 forceps, a paracentesis was created at the limbus.  Lidocaine 1% solution was placed in the anterior chamber. Viscoelastic was injected into the anterior chamber using a canula.  A 2.4 mm keratome was then used to construct a clear corneal incision at the 10 o'clock position.  Using Utrata forceps and cystotome needle, a continuous curvilinear capsulorrhexis was created. The capsulorrhexis was noted to extend out slightly around 10 o'clock so, capsulorrhexis was aborted and the flap was cut with eden scissors.  and hydrodissection was undertaken with the use of BSS. The cataract was also hydrodelineated. The nucleus was found to be freely mobile and then removed by phacoemulsification using a chop technique.  The remaining elements of cortex were then removed with irrigation/aspiration.  An IOL,was injected into the capsular bag and haptics were placed almost 90 degrees from the extended part of the rrhexis. The remaining elements of viscoelastic were then removed with irrigation/aspiration. The wounds were hydrated and were watertight. Intracameral moxifloxacin was  placed in the eye.     The lid speculum was removed. The eye was cleaned with wet and dry gauze. A shield were placed over the eye.  The patient was discharge in stable condition having tolerated the procedure well    Corrie Lemus MD  Resident Physician, PGY-3  Department of Ophthalmology  12/26/22 10:14 AM      I was present and performed the surgery.   Contreras Doty MD

## 2022-12-26 NOTE — DISCHARGE INSTRUCTIONS
Dr. Contreras Doty  [facility]  [phone]  Post Operative Cataract Instructions    If you have a gauze eye patch on, please do not remove it until it is removed by your physician at your first post-operative visit.  You will start your eye drops the next day.    Wear the clear eye shield when sleeping for protection for 5 days.    Do not rub the operated eye.    Same-Day Surgery   Adult Discharge Orders & Instructions     For 24 hours after surgery:  Get plenty of rest.  A responsible adult must stay with you for at least 24 hours after you leave the hospital.   Pain medication can slow your reflexes. Do not drive or use heavy equipment.  If you have weakness or tingling, don't drive or use heavy equipment until this feeling goes away.  Mixing alcohol and pain medication can cause dizziness and slow your breathing. It can even be fatal. Do not drink alcohol while taking pain medication.  Avoid strenuous or risky activities.  Ask for help when climbing stairs.   You may feel lightheaded.  If so, sit for a few minutes before standing.  Have someone help you get up.   If you have nausea (feel sick to your stomach), drink only clear liquids such as apple juice, ginger ale, broth or 7-Up.  Rest may also help.  Be sure to drink enough fluids.  Move to a regular diet as you feel able. Take pain medications with a small amount of solid food, such as toast or crackers, to avoid nausea.   A slight fever is normal. Call the doctor if your fever is over 100 F (37.7 C) (taken under the tongue) or lasts longer than 24 hours.  You may have a dry mouth, muscle aches, trouble sleeping or a sore throat.  These symptoms should go away after 24 hours.  Do not make important or legal decisions.   Pain Management:      1. Take pain medication (if prescribed) for pain as directed by your physician.        2. WARNING: If the pain medication you have been prescribed contains Tylenol  (acetaminophen), DO NOT take additional  doses of Tylenol (acetaminophen).     Call your doctor for any of the followin.  Signs of infection (fever, growing tenderness at the surgery site, severe pain, a large amount of drainage or bleeding, foul-smelling drainage, redness, swelling).    2.  It has been over 8 to 10 hours since surgery and you are still not able to urinate (pee).    3.  Headache for over 24 hours.    4.  Numbness, tingling or weakness the day after surgery (if you had spinal anesthesia).  To contact a doctor, call _____________________________________ or:  '   [phone] and ask for the Resident On Call for:          __________________________________________ (answered 24 hours a day)  '   Emergency Department:  [facility]: [phone]  [facility]: [phone]               Rev. 10/2014      Light sensitivity may be noticed. Sunglasses may be worn for comfort.    Some discomfort and irritation may be noticed. Acetaminophen (Tylenol) or Ibuprofen (Advil) may be taken for discomfort. If pain persists please call Dr. Doty's office.    Keep the operated eye dry. You may wash your hair, bathe or shower, but keep the operated eye closed while doing so.     If you take glaucoma medications, bring them with you to the clinic on your first post operative visit.    Bring your prescribed eye drops with you to your scheduled post-operative appointment.    Use medication exactly as prescribed by your doctor. You may restart your regular home medications.   You may start all three eye drops today after getting home. Please take all three drops three times a day today. Tomorrow please take them four times a day.     Call Dr. Doty's office at [phone] if any of the following should occur:    Any sudden vision changes, including decreased vision  Nausea or severe headache  Increase in pain not controlled  Signs of infection (pus, increasing redness or tenderness)  Severe sensitivity to light

## 2022-12-26 NOTE — ANESTHESIA POSTPROCEDURE EVALUATION
Patient: Leola Bui    Procedure: Procedure(s):  PHACOEMULSIFICATION, CATARACT, WITH INTRAOCULAR LENS IMPLANT RIGHT       Anesthesia Type:  No value filed.    Note:  Disposition: Outpatient   Postop Pain Control: Uneventful            Sign Out: Well controlled pain   PONV: No   Neuro/Psych: Uneventful            Sign Out: Acceptable/Baseline neuro status   Airway/Respiratory: Uneventful            Sign Out: Acceptable/Baseline resp. status   CV/Hemodynamics: Uneventful            Sign Out: Acceptable CV status; No obvious hypovolemia; No obvious fluid overload   Other NRE: NONE   DID A NON-ROUTINE EVENT OCCUR? No           Last vitals:  Vitals Value Taken Time   /63 12/26/22 1130   Temp 36.8  C (98.2  F) 12/26/22 1030   Pulse 95 12/26/22 1130   Resp 16 12/26/22 1030   SpO2 96 % 12/26/22 1132   Vitals shown include unvalidated device data.    Electronically Signed By: Jo Guadarrama MD  December 26, 2022  11:42 AM

## 2022-12-26 NOTE — OR NURSING
pts medications given to daughter thu (eye drops),instructions called to nurse at [facility]- Romi QUINTANA RN.

## 2022-12-26 NOTE — BRIEF OP NOTE
[facility]    Brief Operative Note    Pre-operative diagnosis: Age-related nuclear cataract of right eye [H25.11]  Post-operative diagnosis Same as pre-operative diagnosis    Procedure: Procedure(s):  PHACOEMULSIFICATION, CATARACT, WITH INTRAOCULAR LENS IMPLANT RIGHT  Surgeon: Surgeon(s) and Role:     * Contreras Feldman MD - Primary     Corrie Lemus MD - assistant  Anesthesia: MAC with Topical   Estimated Blood Loss: Minimal    Drains: None  Specimens: * No specimens in log *  Findings:   None.  Complications: None.  Implants:   Implant Name Type Inv. Item Serial No.  Lot No. LRB No. Used Action   EYE IMP IOL ROSARIO ACRYSOF UV SA60WF 17.0D - [id number] Lens/Eye Implant EYE IMP IOL ROSARIO ACRYSOF UV SA60WF 17.0D [id number] [employer]  Right 1 Implanted

## 2022-12-26 NOTE — TELEPHONE ENCOUNTER
Sonja called to let me know Dr. Contreras Fuentes will be doing a telephone follow up for 1 day post-op instead of in person visit with Dr. Guido tomorrow.     Sonja also wanted to know how long the one week post-op may take so she can  schedule her mother's medical transport to and from the appointment. I recommended at least two hours due to the fact all appointments at the eye clinic can take 2 to 4 hours. Since this is a one week post-op appointment she may finish before then but that is the best estimate.

## 2022-12-27 ENCOUNTER — DOCUMENTATION ONLY (OUTPATIENT)
Dept: OPHTHALMOLOGY | Facility: CLINIC | Age: 87
End: 2022-12-27
Payer: MEDICARE

## 2022-12-27 NOTE — PROGRESS NOTES
Called and talked with Bridget Bui, Leola's daughter, who stayed with Leola since her surgery yesterday. Leola is doing great. Her vision is good and doesn't have any pain or discomfort. Her nurses are applying her drops as instructed. We can cancel her post op day 1 appt and I will see her for post op week 1 appt as scheduled. They will call if Leola has any new symptoms.   Contreras Doty MD

## 2023-01-01 ENCOUNTER — LAB REQUISITION (OUTPATIENT)
Dept: LAB | Facility: CLINIC | Age: 88
End: 2023-01-01
Payer: MEDICARE

## 2023-01-01 DIAGNOSIS — Z79.899 OTHER LONG TERM (CURRENT) DRUG THERAPY: ICD-10-CM

## 2023-01-01 DIAGNOSIS — R35.0 FREQUENCY OF MICTURITION: ICD-10-CM

## 2023-01-01 DIAGNOSIS — Z00.00 ENCOUNTER FOR GENERAL ADULT MEDICAL EXAMINATION WITHOUT ABNORMAL FINDINGS: ICD-10-CM

## 2023-01-01 DIAGNOSIS — N39.0 URINARY TRACT INFECTION, SITE NOT SPECIFIED: ICD-10-CM

## 2023-01-01 DIAGNOSIS — R30.0 DYSURIA: ICD-10-CM

## 2023-01-01 DIAGNOSIS — I10 ESSENTIAL (PRIMARY) HYPERTENSION: ICD-10-CM

## 2023-01-01 DIAGNOSIS — D64.9 ANEMIA, UNSPECIFIED: ICD-10-CM

## 2023-01-01 DIAGNOSIS — R41.0 DISORIENTATION, UNSPECIFIED: ICD-10-CM

## 2023-01-01 LAB
ALBUMIN UR-MCNC: 10 MG/DL
ALBUMIN UR-MCNC: 20 MG/DL
ALBUMIN UR-MCNC: 20 MG/DL
ALBUMIN UR-MCNC: 30 MG/DL
ALBUMIN UR-MCNC: NEGATIVE MG/DL
ALBUMIN UR-MCNC: NEGATIVE MG/DL
AMORPH CRY #/AREA URNS HPF: ABNORMAL /HPF
ANION GAP SERPL CALCULATED.3IONS-SCNC: 13 MMOL/L (ref 7–15)
APPEARANCE UR: ABNORMAL
BACTERIA #/AREA URNS HPF: ABNORMAL /HPF
BACTERIA UR CULT: ABNORMAL
BACTERIA UR CULT: NORMAL
BILIRUB UR QL STRIP: NEGATIVE
BUN SERPL-MCNC: 15.5 MG/DL (ref 8–23)
CALCIUM SERPL-MCNC: 9.5 MG/DL (ref 8.8–10.2)
CAOX CRY #/AREA URNS HPF: ABNORMAL /HPF
CHLORIDE SERPL-SCNC: 96 MMOL/L (ref 98–107)
COLOR UR AUTO: ABNORMAL
COLOR UR AUTO: YELLOW
CREAT SERPL-MCNC: 0.65 MG/DL (ref 0.51–0.95)
DEPRECATED HCO3 PLAS-SCNC: 27 MMOL/L (ref 22–29)
ERYTHROCYTE [DISTWIDTH] IN BLOOD BY AUTOMATED COUNT: 14 % (ref 10–15)
GFR SERPL CREATININE-BSD FRML MDRD: 85 ML/MIN/1.73M2
GLUCOSE SERPL-MCNC: 92 MG/DL (ref 70–99)
GLUCOSE UR STRIP-MCNC: NEGATIVE MG/DL
HCT VFR BLD AUTO: 42.6 % (ref 35–47)
HGB BLD-MCNC: 13.8 G/DL (ref 11.7–15.7)
HGB UR QL STRIP: ABNORMAL
HGB UR QL STRIP: ABNORMAL
HGB UR QL STRIP: NEGATIVE
HYALINE CASTS: 1 /LPF
HYALINE CASTS: 24 /LPF
HYALINE CASTS: 3 /LPF
HYALINE CASTS: 8 /LPF
KETONES UR STRIP-MCNC: ABNORMAL MG/DL
KETONES UR STRIP-MCNC: NEGATIVE MG/DL
LEUKOCYTE ESTERASE UR QL STRIP: ABNORMAL
LEUKOCYTE ESTERASE UR QL STRIP: NEGATIVE
MCH RBC QN AUTO: 33.1 PG (ref 26.5–33)
MCHC RBC AUTO-ENTMCNC: 32.4 G/DL (ref 31.5–36.5)
MCV RBC AUTO: 102 FL (ref 78–100)
MUCOUS THREADS #/AREA URNS LPF: PRESENT /LPF
NITRATE UR QL: NEGATIVE
NITRATE UR QL: POSITIVE
PH UR STRIP: 5.5 [PH] (ref 5–7)
PH UR STRIP: 6 [PH] (ref 5–7)
PH UR STRIP: 7 [PH] (ref 5–7)
PLATELET # BLD AUTO: 183 10E3/UL (ref 150–450)
POTASSIUM SERPL-SCNC: 3 MMOL/L (ref 3.4–5.3)
POTASSIUM SERPL-SCNC: 3.6 MMOL/L (ref 3.4–5.3)
POTASSIUM SERPL-SCNC: 3.8 MMOL/L (ref 3.4–5.3)
RBC # BLD AUTO: 4.17 10E6/UL (ref 3.8–5.2)
RBC URINE: 0 /HPF
RBC URINE: 1 /HPF
RBC URINE: 1 /HPF
RBC URINE: 2 /HPF
RBC URINE: 3 /HPF
RBC URINE: 3 /HPF
SODIUM SERPL-SCNC: 136 MMOL/L (ref 136–145)
SP GR UR STRIP: 1.01 (ref 1–1.03)
SP GR UR STRIP: 1.01 (ref 1–1.03)
SP GR UR STRIP: 1.02 (ref 1–1.03)
SP GR UR STRIP: 1.03 (ref 1–1.03)
SQUAMOUS EPITHELIAL: 1 /HPF
SQUAMOUS EPITHELIAL: 2 /HPF
SQUAMOUS EPITHELIAL: 26 /HPF
SQUAMOUS EPITHELIAL: 4 /HPF
SQUAMOUS EPITHELIAL: 69 /HPF
SQUAMOUS EPITHELIAL: <1 /HPF
UROBILINOGEN UR STRIP-MCNC: NORMAL MG/DL
WBC # BLD AUTO: 5.7 10E3/UL (ref 4–11)
WBC CLUMPS #/AREA URNS HPF: PRESENT /HPF
WBC URINE: 16 /HPF
WBC URINE: 16 /HPF
WBC URINE: 22 /HPF
WBC URINE: 3 /HPF
WBC URINE: 30 /HPF
WBC URINE: 31 /HPF
YEAST #/AREA URNS HPF: ABNORMAL /HPF

## 2023-01-01 PROCEDURE — 84132 ASSAY OF SERUM POTASSIUM: CPT | Mod: ORL | Performed by: NURSE PRACTITIONER

## 2023-01-01 PROCEDURE — 36415 COLL VENOUS BLD VENIPUNCTURE: CPT | Mod: ORL | Performed by: NURSE PRACTITIONER

## 2023-01-01 PROCEDURE — 87086 URINE CULTURE/COLONY COUNT: CPT | Mod: ORL | Performed by: NURSE PRACTITIONER

## 2023-01-01 PROCEDURE — P9604 ONE-WAY ALLOW PRORATED TRIP: HCPCS | Mod: ORL | Performed by: NURSE PRACTITIONER

## 2023-01-01 PROCEDURE — 85027 COMPLETE CBC AUTOMATED: CPT | Mod: ORL | Performed by: NURSE PRACTITIONER

## 2023-01-01 PROCEDURE — 87086 URINE CULTURE/COLONY COUNT: CPT | Mod: ORL

## 2023-01-01 PROCEDURE — 87186 SC STD MICRODIL/AGAR DIL: CPT | Mod: ORL | Performed by: NURSE PRACTITIONER

## 2023-01-01 PROCEDURE — 87186 SC STD MICRODIL/AGAR DIL: CPT | Mod: ORL

## 2023-01-01 PROCEDURE — 80048 BASIC METABOLIC PNL TOTAL CA: CPT | Mod: ORL | Performed by: NURSE PRACTITIONER

## 2023-01-01 PROCEDURE — P9603 ONE-WAY ALLOW PRORATED MILES: HCPCS | Mod: ORL | Performed by: NURSE PRACTITIONER

## 2023-01-01 PROCEDURE — 81001 URINALYSIS AUTO W/SCOPE: CPT | Mod: ORL

## 2023-01-01 PROCEDURE — 81001 URINALYSIS AUTO W/SCOPE: CPT | Mod: ORL | Performed by: PHYSICIAN ASSISTANT

## 2023-01-01 PROCEDURE — 81001 URINALYSIS AUTO W/SCOPE: CPT | Mod: ORL | Performed by: NURSE PRACTITIONER

## 2023-01-02 ENCOUNTER — TELEPHONE (OUTPATIENT)
Dept: OPHTHALMOLOGY | Facility: CLINIC | Age: 88
End: 2023-01-02

## 2023-01-02 NOTE — TELEPHONE ENCOUNTER
Sonja called because Leola has COVID, she tested positive yesterday and cannot come to clinic on Wednesday. Rescheduled to next Wednesday 01/11 with Dr. Contreras Fuentes.    Sonja also wanted to know how long Leola should continue wearing the eye shield? I let Sonja know I would send this message to the clinic team to reach out with further instruction.

## 2023-01-02 NOTE — TELEPHONE ENCOUNTER
Called patient and left VM. Recommend using shield for 1 week following surgery. Today marks POD7. It is appropriate to stop wearing shield now unless there is concern for continued eye rubbing.     Invited patient to call back with any additional questions or concerns.     Trenton Guido MD  Vitreoretinal Surgical Fellow, PGY6  [facility]

## 2023-01-11 ENCOUNTER — OFFICE VISIT (OUTPATIENT)
Dept: OPHTHALMOLOGY | Facility: CLINIC | Age: 88
End: 2023-01-11
Attending: OPHTHALMOLOGY
Payer: MEDICARE

## 2023-01-11 DIAGNOSIS — Z48.810 AFTERCARE FOLLOWING SURGERY OF A SENSE ORGAN: Primary | ICD-10-CM

## 2023-01-11 PROCEDURE — G0463 HOSPITAL OUTPT CLINIC VISIT: HCPCS

## 2023-01-11 PROCEDURE — 99024 POSTOP FOLLOW-UP VISIT: CPT | Mod: GC | Performed by: OPHTHALMOLOGY

## 2023-01-11 ASSESSMENT — REFRACTION_WEARINGRX
OD_ADD: +2.75
OS_ADD: +2.75
OD_SPHERE: -5.50
OS_CYLINDER: +1.50
OS_AXIS: 172
OD_CYLINDER: +1.25
OS_SPHERE: -5.25
SPECS_TYPE: TRIFOCAL
OD_AXIS: 005

## 2023-01-11 ASSESSMENT — CONF VISUAL FIELD
OS_SUPERIOR_TEMPORAL_RESTRICTION: 0
OD_INFERIOR_NASAL_RESTRICTION: 0
OD_NORMAL: 1
OS_INFERIOR_TEMPORAL_RESTRICTION: 0
OS_INFERIOR_NASAL_RESTRICTION: 0
METHOD: COUNTING FINGERS
OS_NORMAL: 1
OD_SUPERIOR_NASAL_RESTRICTION: 0
OD_SUPERIOR_TEMPORAL_RESTRICTION: 0
OD_INFERIOR_TEMPORAL_RESTRICTION: 0
OS_SUPERIOR_NASAL_RESTRICTION: 0

## 2023-01-11 ASSESSMENT — VISUAL ACUITY
OD_SC+: +1
OS_PH_SC: 20/60
OS_SC: 20/70
METHOD: SNELLEN - LINEAR
OD_SC: 20/100
OD_PH_SC: 20/80
OD_PH_SC+: +1

## 2023-01-11 ASSESSMENT — TONOMETRY
OD_IOP_MMHG: 9
IOP_METHOD: TONOPEN
OS_IOP_MMHG: 14

## 2023-01-11 ASSESSMENT — EXTERNAL EXAM - RIGHT EYE: OD_EXAM: NORMAL

## 2023-01-11 ASSESSMENT — EXTERNAL EXAM - LEFT EYE: OS_EXAM: NORMAL

## 2023-01-11 ASSESSMENT — CUP TO DISC RATIO: OD_RATIO: 0.05

## 2023-01-11 NOTE — PROGRESS NOTES
Goes by Peg    CC -  Decreased vision right eye     INTERVAL HISTORY - POW3 s/p CEIOL right eye, and POM3 s/p CEIOL OS. Missed POW1 visit with us b/c of COVID. She is feeling better from COVID - mostly tired.     She isn't seeing very well but daughter supposes that she is seeing a little better. She lives in an assisted living facility. Drops (per records):    Ofloxacin 4x/day Right  Prednisolone 4x/day Right    HPI -   Leola Bui is a  87 year old patient presenting for follow up after CEIOL OU in 2022. She has a history of CRVO OD, and has received multiple injection in [place] (by Dr. Siegel) and in [place] (Dr. Diaz). Last injection ~2019 in [place].       OCULAR SURGERIES  09/19/2022 Cataract/IOL Left  12/26/2022 Cataract/IOL Right    RETINAL IMAGING:  OCT 6/29/2022  OD - CME, ERM  OS - normal foveal contour; drusen at fovea      ASSESSMENT & PLAN    # Pseudophakia OD   - s/p CEIOL OD 12/26/22 01/11/23 POW3 - Doing well. VAph 20/80. Will bring back for MRx at next visit and check for CME with OCT Mac. Will taper PF to BID then daily then stop. Stop ofloxacin.     # Pseudophakia OS   S/p CEIOL OS 9/19/22    # History of CRVO right eye (records requested for review);    S/P avastin injection (last 3 yrs ago)    Trenton Guido MD  Vitreoretinal Surgical Fellow, PGY6  [facility]      Call Daysi (daughter) for scheduling at       Complete documentation of historical and exam elements from today's encounter can be found in the full encounter summary report (not reduplicated in this progress note). I personally obtained the chief complaint(s) and history of present illness.  I confirmed and edited as necessary the review of systems, past medical/surgical history, family history, social history, and examination findings as documented by others; and I examined the patient myself. I personally reviewed the relevant tests, images, and reports as documented above. I formulated and edited as  necessary the assessment and plan and discussed the findings and management plan with the patient and family.     Contreras Doty MD, PhD   2022 07:41

## 2023-01-11 NOTE — NURSING NOTE
Chief Complaints and History of Present Illnesses   Patient presents with     Post Op (Ophthalmology) Right Eye     12/26/22 PHACO IOL RE Dr. Doty      Chief Complaint(s) and History of Present Illness(es)     Post Op (Ophthalmology) Right Eye            Laterality: right eye    Onset: 2 weeks ago    Associated symptoms: dryness and photophobia.  Negative for flashes, floaters and itching    Pain scale: 0/10    Comments: 12/26/22 PHACO IOL RE Dr. Doty           Comments    Pt has no complaints at this time.  She wants to know when she can get glasses to see better.     Ocular meds:  Ketorolac and prednisolone to 3 times a day for one week, then two times a day for one    JOHNIE SCHILLING January 11, 2023 11:37 AM

## 2023-02-01 ENCOUNTER — TELEPHONE (OUTPATIENT)
Dept: OPHTHALMOLOGY | Facility: CLINIC | Age: 88
End: 2023-02-01
Payer: MEDICARE

## 2023-02-01 NOTE — TELEPHONE ENCOUNTER
Spoke to [facility] nursing at 1310    Pt s/p cataract surgery in each eye    Right eye 12-   Left eye about 3 months ago    Reviewed per my review Ketorolac now discontinued and will fax orders for eye drops to [facility] to confirm eye drop regimen    Fax Number: [phone]    Reid Kanh RN 1:13 PM 02/01/23           Health Call Center    Phone Message    May a detailed message be left on voicemail: yes     Reason for Call: Medication Question or concern regarding medication   Prescription Clarification  Name of Medication: ketorolac (ACULAR) 0.5 % ophthalmic solution  Prescribing Provider: Dr. Doty   Pharmacy: [facility] [address]   What on the order needs clarification? Clinic coordinator Sonja is wondering if pt needs to continue on this medication until her post-op Appt on 2/14. Please call Sonja at Ph # [phone]. Thank you.      Action Taken: Message routed to:  [facility] EYE    Travel Screening: Not Applicable

## 2023-02-09 ENCOUNTER — TELEPHONE (OUTPATIENT)
Dept: OPHTHALMOLOGY | Facility: CLINIC | Age: 88
End: 2023-02-09
Payer: MEDICARE

## 2023-02-09 NOTE — TELEPHONE ENCOUNTER
Reviewed by Dr. Doty    Order letter created/faxed today to [facility]    Discontinue ketorolac  If pt using prednisolone still-- taper as follows:  One drop 2/day in right eye for 3 days and then one drop in right eye daily for 3 days and then stop    Reid Kahn RN 8:12 AM 02/09/23

## 2023-02-09 NOTE — LETTER
[facility] EYE [address]  [address]  [facility] [address]  Phone: [phone]  Fax: [phone]       Leola Bui   1935  MRN [id number]    Orderrs for [facility] center per Ophthalmology 2023    1. Discontinue Ketorolac Ophthalmic Solution.    2. Only if pt still receiving prednisolone eye drops:             2a. Taper Prednisolone Acetate 1% ophthalmic solution.  One drop in Right eye Twice a   Day for 3 days and then one drop Once Daily for 3 days and then stop.      Thank you and please call Triage line for any further questions:  [phone]          Dr. Contreras Doty (staff MD)  [facility] Eye [facility]  [phone]

## 2023-02-14 ENCOUNTER — OFFICE VISIT (OUTPATIENT)
Dept: OPHTHALMOLOGY | Facility: CLINIC | Age: 88
End: 2023-02-14
Attending: OPHTHALMOLOGY
Payer: MEDICARE

## 2023-02-14 DIAGNOSIS — Z48.810 AFTERCARE FOLLOWING SURGERY OF A SENSE ORGAN: Primary | ICD-10-CM

## 2023-02-14 DIAGNOSIS — H53.15 VISUAL DISTORTIONS OF SHAPE AND SIZE: ICD-10-CM

## 2023-02-14 DIAGNOSIS — H34.8192 HISTORY OF CENTRAL RETINAL VEIN OCCLUSION (H): ICD-10-CM

## 2023-02-14 PROCEDURE — 92015 DETERMINE REFRACTIVE STATE: CPT | Mod: GY

## 2023-02-14 PROCEDURE — G0463 HOSPITAL OUTPT CLINIC VISIT: HCPCS | Mod: 25

## 2023-02-14 PROCEDURE — 99024 POSTOP FOLLOW-UP VISIT: CPT | Mod: GC | Performed by: OPHTHALMOLOGY

## 2023-02-14 ASSESSMENT — CUP TO DISC RATIO
OD_RATIO: 0.05
OS_RATIO: 0.2

## 2023-02-14 ASSESSMENT — VISUAL ACUITY
OS_SC: 20/70
OD_SC+: -1
METHOD: SNELLEN - LINEAR
OD_SC: 20/125

## 2023-02-14 ASSESSMENT — REFRACTION_MANIFEST
OD_ADD: +2.75
OS_AXIS: 180
OD_SPHERE: -1.25
OS_SPHERE: -1.00
OD_CYLINDER: +1.50
OS_CYLINDER: +2.00
OD_AXIS: 180
OS_ADD: +2.75

## 2023-02-14 ASSESSMENT — TONOMETRY
OD_IOP_MMHG: 13
OS_IOP_MMHG: 14
IOP_METHOD: TONOPEN

## 2023-02-14 ASSESSMENT — SLIT LAMP EXAM - LIDS
COMMENTS: NORMAL
COMMENTS: NORMAL

## 2023-02-14 ASSESSMENT — EXTERNAL EXAM - RIGHT EYE: OD_EXAM: NORMAL

## 2023-02-14 ASSESSMENT — EXTERNAL EXAM - LEFT EYE: OS_EXAM: NORMAL

## 2023-02-14 NOTE — PATIENT INSTRUCTIONS
For more information on low vision services, go online to:  [email or web address] and click on the link at the bottom of the page for:  Resource Directory for the Visually Impaired      Low vision resources    1. Dr. Ang Edward is a low vision optometrist at the [facility] who specializes in prescribing glasses to those with reduced vision.  To make an appointment, please call [phone].      2.  The [facility] has an occupational therapist specializing in low vision services. These services will be billed to your insurance.  If you would like a referral, please let your doctor know.      3. The [employer] store is a brick and mortar location in [place] with online ordering available.  They carry magnifiers, watches, daily life aids, games, canes, and other daily life products for those with low vision.  To contact them electronically: [email or web address]. Phone: [phone]. Website: [employer].     4. [employer]: [email or web address]/help/customer/accessibility    5. [employer]: [phone]    6. [employer] Accessibility Support [phone]    7. [employer] Accessibility Answer Desk  [phone].     8. [employer] Disability Support     To request a call back:  [email or web address]  To chat online: [email or web address]  To email: [email or web address]    9. [employer]: [phone]

## 2023-02-14 NOTE — NURSING NOTE
Chief Complaints and History of Present Illnesses   Patient presents with     Post Op (Ophthalmology) Both Eyes     Chief Complaint(s) and History of Present Illness(es)     Post Op (Ophthalmology) Both Eyes            Laterality: both eyes    Course: stable    Associated symptoms: Negative for eye pain, flashes and floaters    Treatments tried: eye drops          Comments    Here for post op CE/IOL both eyes. Vision is doing well - not quite to where she expected but has improved. Compliant with drops. No flashes or floaters. No eye pain.    Jw House COT 1:05 PM February 14, 2023

## 2023-02-14 NOTE — PROGRESS NOTES
Goes by Peg    CC -  Decreased vision right eye     INTERVAL HISTORY - POM2 s/p CEIOL right eye, and POM5 s/p CEIOL OS. Daughter feels patient's vision is better compared to before cataract surgery but not 100%. She lives in an assisted living facility. Completed all post op drops.     HPI -   Leola Bui is a  87 year old patient presenting for follow up after CEIOL OU in 2022. She has a history of CRVO OD, and has received multiple injection in [place] (by Dr. Siegel) and in [place] (Dr. Diaz). Last injection ~2019 in [place].     OCULAR SURGERIES  09/19/2022 Cataract/IOL Left  12/26/2022 Cataract/IOL Right    RETINAL IMAGING:  OCT 6/29/2022  OD - CME, ERM  OS - normal foveal contour; drusen at fovea    ASSESSMENT & PLAN    # Pseudophakia OD  - s/p CEIOL OD 12/26/22 02/14/23 POM2: Doing well. BCVA 20/70 with new MRX. Preop was 20/150cc  - was not able to sit behind OCT macula today    # Pseudophakia OS  S/p CEIOL OS 9/19/22 - BCVA today 20/40 with MRX    # History of CRVO right eye (records requested for review);   S/P avastin injection (last 3 yrs ago)    # Refractive error, OU  - Discussed and dispensed MRX  - Discussed low vision clinic and resources      Call Daysi (daughter) for scheduling at     Berto Pederson MD  Resident Physician - PGY3  Department of Ophthalmology   [facility]    Complete documentation of historical and exam elements from today's encounter can be found in the full encounter summary report (not reduplicated in this progress note). I personally obtained the chief complaint(s) and history of present illness.  I confirmed and edited as necessary the review of systems, past medical/surgical history, family history, social history, and examination findings as documented by others; and I examined the patient myself. I personally reviewed the relevant tests, images, and reports as documented above. I formulated and edited as necessary the assessment and plan and discussed  the findings and management plan with the patient and family.     Contreras Doty MD, PhD

## 2023-07-19 NOTE — OR NURSING
0929 Dr Edith Guadarrama notified about pts low blood pressure. 1000 Dr Sanjeev Guadarrama at bedside to assess and talk to pt and pt's daughter. No additional orders received. 1100 Pt's blood pressure is increasing. 1130 Pt meets criteria to de discharged back to facility. Pt up to use commode via karina lift and then transferred to wheelchair. pt's daughter at bedside for entire phase 2. pt's daughter has no additional questions. Report given to care facility RN.    Rhombic Flap Text: The defect edges were debeveled with a #15 scalpel blade.  Given the location of the defect and the proximity to free margins a rhombic flap was deemed most appropriate.  Using a sterile surgical marker, an appropriate rhombic flap was drawn incorporating the defect.    The area thus outlined was incised deep to adipose tissue with a #15 scalpel blade.  The skin margins were undermined to an appropriate distance in all directions utilizing iris scissors.

## 2024-01-01 ENCOUNTER — APPOINTMENT (OUTPATIENT)
Dept: CT IMAGING | Facility: CLINIC | Age: 89
End: 2024-01-01
Attending: EMERGENCY MEDICINE
Payer: MEDICARE

## 2024-01-01 ENCOUNTER — HOSPITAL ENCOUNTER (INPATIENT)
Facility: CLINIC | Age: 89
LOS: 1 days | DRG: 064 | End: 2024-03-01
Attending: EMERGENCY MEDICINE | Admitting: INTERNAL MEDICINE
Payer: MEDICARE

## 2024-01-01 ENCOUNTER — HOSPITAL ENCOUNTER (OUTPATIENT)
Facility: CLINIC | Age: 89
Setting detail: OBSERVATION
Discharge: NURSING, REHAB OR LONG-TERM CARE | End: 2024-01-10
Attending: EMERGENCY MEDICINE | Admitting: HOSPITALIST
Payer: MEDICARE

## 2024-01-01 ENCOUNTER — LAB REQUISITION (OUTPATIENT)
Dept: LAB | Facility: CLINIC | Age: 89
End: 2024-01-01
Payer: MEDICARE

## 2024-01-01 ENCOUNTER — MEDICAL CORRESPONDENCE (OUTPATIENT)
Dept: HEALTH INFORMATION MANAGEMENT | Facility: CLINIC | Age: 89
End: 2024-01-01

## 2024-01-01 ENCOUNTER — APPOINTMENT (OUTPATIENT)
Dept: SPEECH THERAPY | Facility: CLINIC | Age: 89
End: 2024-01-01
Payer: MEDICARE

## 2024-01-01 ENCOUNTER — APPOINTMENT (OUTPATIENT)
Dept: SPEECH THERAPY | Facility: CLINIC | Age: 89
End: 2024-01-01
Attending: HOSPITALIST
Payer: MEDICARE

## 2024-01-01 ENCOUNTER — APPOINTMENT (OUTPATIENT)
Dept: CT IMAGING | Facility: CLINIC | Age: 89
DRG: 064 | End: 2024-01-01
Attending: EMERGENCY MEDICINE
Payer: MEDICARE

## 2024-01-01 VITALS
TEMPERATURE: 98 F | BODY MASS INDEX: 24.27 KG/M2 | DIASTOLIC BLOOD PRESSURE: 85 MMHG | OXYGEN SATURATION: 98 % | HEART RATE: 77 BPM | WEIGHT: 151.01 LBS | HEIGHT: 66 IN | RESPIRATION RATE: 16 BRPM | SYSTOLIC BLOOD PRESSURE: 149 MMHG

## 2024-01-01 VITALS — OXYGEN SATURATION: 96 % | RESPIRATION RATE: 10 BRPM | HEART RATE: 91 BPM

## 2024-01-01 DIAGNOSIS — N28.9 RENAL INSUFFICIENCY: ICD-10-CM

## 2024-01-01 DIAGNOSIS — R30.0 DYSURIA: ICD-10-CM

## 2024-01-01 DIAGNOSIS — I61.9 INTRAPARENCHYMAL HEMORRHAGE OF BRAIN (H): ICD-10-CM

## 2024-01-01 DIAGNOSIS — Z86.79 HISTORY OF ATRIAL FIBRILLATION: ICD-10-CM

## 2024-01-01 DIAGNOSIS — E86.0 DEHYDRATION: ICD-10-CM

## 2024-01-01 DIAGNOSIS — Z86.59 HISTORY OF DEMENTIA: ICD-10-CM

## 2024-01-01 DIAGNOSIS — M79.89 SOFT TISSUE MASS: ICD-10-CM

## 2024-01-01 DIAGNOSIS — K80.20 CALCULUS OF GALLBLADDER WITHOUT CHOLECYSTITIS WITHOUT OBSTRUCTION: ICD-10-CM

## 2024-01-01 DIAGNOSIS — K56.41 FECAL IMPACTION (H): ICD-10-CM

## 2024-01-01 DIAGNOSIS — R53.1 GENERALIZED WEAKNESS: ICD-10-CM

## 2024-01-01 LAB
ALBUMIN SERPL BCG-MCNC: 3.5 G/DL (ref 3.5–5.2)
ALBUMIN UR-MCNC: 10 MG/DL
ALBUMIN UR-MCNC: NEGATIVE MG/DL
ALP SERPL-CCNC: 69 U/L (ref 40–150)
ALT SERPL W P-5'-P-CCNC: <5 U/L (ref 0–50)
ANION GAP BLD CALCULATED.3IONS-SCNC: 18 MMOL/L (ref 3–14)
ANION GAP SERPL CALCULATED.3IONS-SCNC: 10 MMOL/L (ref 7–15)
ANION GAP SERPL CALCULATED.3IONS-SCNC: 12 MMOL/L (ref 7–15)
ANION GAP SERPL CALCULATED.3IONS-SCNC: 15 MMOL/L (ref 7–15)
APPEARANCE UR: ABNORMAL
APPEARANCE UR: ABNORMAL
APTT PPP: 34 SECONDS (ref 22–38)
AST SERPL W P-5'-P-CCNC: 17 U/L (ref 0–45)
ATRIAL RATE - MUSE: NORMAL BPM
ATRIAL RATE - MUSE: NORMAL BPM
BACTERIA #/AREA URNS HPF: ABNORMAL /HPF
BACTERIA #/AREA URNS HPF: ABNORMAL /HPF
BACTERIA UR CULT: NORMAL
BASE EXCESS BLDV CALC-SCNC: -4 MMOL/L (ref -7.7–1.9)
BASOPHILS # BLD AUTO: 0 10E3/UL (ref 0–0.2)
BASOPHILS # BLD AUTO: 0 10E3/UL (ref 0–0.2)
BASOPHILS NFR BLD AUTO: 0 %
BASOPHILS NFR BLD AUTO: 0 %
BILIRUB SERPL-MCNC: 0.5 MG/DL
BILIRUB UR QL STRIP: NEGATIVE
BILIRUB UR QL STRIP: NEGATIVE
BUN SERPL-MCNC: 18.3 MG/DL (ref 8–23)
BUN SERPL-MCNC: 19 MG/DL (ref 7–30)
BUN SERPL-MCNC: 26 MG/DL (ref 8–23)
BUN SERPL-MCNC: 39.3 MG/DL (ref 8–23)
CA-I BLD-MCNC: 4.9 MG/DL (ref 4.4–5.2)
CALCIUM SERPL-MCNC: 8.1 MG/DL (ref 8.8–10.2)
CALCIUM SERPL-MCNC: 9 MG/DL (ref 8.8–10.2)
CALCIUM SERPL-MCNC: 9.4 MG/DL (ref 8.8–10.2)
CHLORIDE BLD-SCNC: 101 MMOL/L (ref 94–109)
CHLORIDE SERPL-SCNC: 101 MMOL/L (ref 98–107)
CHLORIDE SERPL-SCNC: 104 MMOL/L (ref 98–107)
CHLORIDE SERPL-SCNC: 98 MMOL/L (ref 98–107)
CO2 BLD-SCNC: 26 MMOL/L (ref 20–32)
COLOR UR AUTO: ABNORMAL
COLOR UR AUTO: YELLOW
CREAT BLD-MCNC: 0.9 MG/DL (ref 0.5–1)
CREAT SERPL-MCNC: 0.84 MG/DL (ref 0.51–0.95)
CREAT SERPL-MCNC: 0.84 MG/DL (ref 0.51–0.95)
CREAT SERPL-MCNC: 1.4 MG/DL (ref 0.51–0.95)
DEPRECATED HCO3 PLAS-SCNC: 19 MMOL/L (ref 22–29)
DEPRECATED HCO3 PLAS-SCNC: 21 MMOL/L (ref 22–29)
DEPRECATED HCO3 PLAS-SCNC: 23 MMOL/L (ref 22–29)
DIASTOLIC BLOOD PRESSURE - MUSE: NORMAL MMHG
DIASTOLIC BLOOD PRESSURE - MUSE: NORMAL MMHG
EGFRCR SERPLBLD CKD-EPI 2021: 36 ML/MIN/1.73M2
EGFRCR SERPLBLD CKD-EPI 2021: 66 ML/MIN/1.73M2
EGFRCR SERPLBLD CKD-EPI 2021: 66 ML/MIN/1.73M2
EOSINOPHIL # BLD AUTO: 0 10E3/UL (ref 0–0.7)
EOSINOPHIL # BLD AUTO: 0.1 10E3/UL (ref 0–0.7)
EOSINOPHIL NFR BLD AUTO: 0 %
EOSINOPHIL NFR BLD AUTO: 1 %
ERYTHROCYTE [DISTWIDTH] IN BLOOD BY AUTOMATED COUNT: 14.6 % (ref 10–15)
ERYTHROCYTE [DISTWIDTH] IN BLOOD BY AUTOMATED COUNT: 15 % (ref 10–15)
GLUCOSE BLD-MCNC: 183 MG/DL (ref 70–99)
GLUCOSE SERPL-MCNC: 106 MG/DL (ref 70–99)
GLUCOSE SERPL-MCNC: 176 MG/DL (ref 70–99)
GLUCOSE SERPL-MCNC: 98 MG/DL (ref 70–99)
GLUCOSE UR STRIP-MCNC: NEGATIVE MG/DL
GLUCOSE UR STRIP-MCNC: NEGATIVE MG/DL
HCO3 BLDV-SCNC: 23 MMOL/L (ref 21–28)
HCO3 BLDV-SCNC: 27 MMOL/L (ref 21–28)
HCT VFR BLD AUTO: 41.4 % (ref 35–47)
HCT VFR BLD AUTO: 43.8 % (ref 35–47)
HCT VFR BLD CALC: 46 % (ref 35–47)
HGB BLD-MCNC: 13.3 G/DL (ref 11.7–15.7)
HGB BLD-MCNC: 14.7 G/DL (ref 11.7–15.7)
HGB BLD-MCNC: 15.6 G/DL (ref 11.7–15.7)
HGB UR QL STRIP: ABNORMAL
HGB UR QL STRIP: NEGATIVE
HOLD SPECIMEN: NORMAL
HYALINE CASTS: 1 /LPF
HYALINE CASTS: 1 /LPF
IMM GRANULOCYTES # BLD: 0.1 10E3/UL
IMM GRANULOCYTES # BLD: 0.1 10E3/UL
IMM GRANULOCYTES NFR BLD: 1 %
IMM GRANULOCYTES NFR BLD: 1 %
INR PPP: 1.47 (ref 0.85–1.15)
INTERPRETATION ECG - MUSE: NORMAL
INTERPRETATION ECG - MUSE: NORMAL
KETONES UR STRIP-MCNC: NEGATIVE MG/DL
KETONES UR STRIP-MCNC: NEGATIVE MG/DL
LACTATE BLD-SCNC: 1.9 MMOL/L
LACTATE SERPL-SCNC: 1 MMOL/L (ref 0.7–2)
LACTATE SERPL-SCNC: 3.4 MMOL/L (ref 0.7–2)
LEUKOCYTE ESTERASE UR QL STRIP: ABNORMAL
LEUKOCYTE ESTERASE UR QL STRIP: NEGATIVE
LYMPHOCYTES # BLD AUTO: 0.6 10E3/UL (ref 0.8–5.3)
LYMPHOCYTES # BLD AUTO: 0.9 10E3/UL (ref 0.8–5.3)
LYMPHOCYTES NFR BLD AUTO: 5 %
LYMPHOCYTES NFR BLD AUTO: 9 %
MCH RBC QN AUTO: 33.3 PG (ref 26.5–33)
MCH RBC QN AUTO: 34.3 PG (ref 26.5–33)
MCHC RBC AUTO-ENTMCNC: 32.1 G/DL (ref 31.5–36.5)
MCHC RBC AUTO-ENTMCNC: 33.6 G/DL (ref 31.5–36.5)
MCV RBC AUTO: 102 FL (ref 78–100)
MCV RBC AUTO: 104 FL (ref 78–100)
MONOCYTES # BLD AUTO: 0.5 10E3/UL (ref 0–1.3)
MONOCYTES # BLD AUTO: 1.1 10E3/UL (ref 0–1.3)
MONOCYTES NFR BLD AUTO: 11 %
MONOCYTES NFR BLD AUTO: 4 %
MUCOUS THREADS #/AREA URNS LPF: PRESENT /LPF
MUCOUS THREADS #/AREA URNS LPF: PRESENT /LPF
NEUTROPHILS # BLD AUTO: 11.6 10E3/UL (ref 1.6–8.3)
NEUTROPHILS # BLD AUTO: 7.8 10E3/UL (ref 1.6–8.3)
NEUTROPHILS NFR BLD AUTO: 78 %
NEUTROPHILS NFR BLD AUTO: 90 %
NITRATE UR QL: NEGATIVE
NITRATE UR QL: POSITIVE
NRBC # BLD AUTO: 0 10E3/UL
NRBC # BLD AUTO: 0 10E3/UL
NRBC BLD AUTO-RTO: 0 /100
NRBC BLD AUTO-RTO: 0 /100
O2/TOTAL GAS SETTING VFR VENT: 21 %
OXYHGB MFR BLDV: 36 % (ref 70–75)
P AXIS - MUSE: NORMAL DEGREES
P AXIS - MUSE: NORMAL DEGREES
PCO2 BLDV: 37 MM HG (ref 40–50)
PCO2 BLDV: 50 MM HG (ref 40–50)
PH BLDV: 7.28 [PH] (ref 7.32–7.43)
PH BLDV: 7.47 [PH] (ref 7.32–7.43)
PH UR STRIP: 5.5 [PH] (ref 5–7)
PH UR STRIP: 5.5 [PH] (ref 5–7)
PLATELET # BLD AUTO: 197 10E3/UL (ref 150–450)
PLATELET # BLD AUTO: 220 10E3/UL (ref 150–450)
PO2 BLDV: 23 MM HG (ref 25–47)
PO2 BLDV: 51 MM HG (ref 25–47)
POTASSIUM BLD-SCNC: 4.2 MMOL/L (ref 3.4–5.3)
POTASSIUM SERPL-SCNC: 3.8 MMOL/L (ref 3.4–5.3)
POTASSIUM SERPL-SCNC: 4.2 MMOL/L (ref 3.4–5.3)
POTASSIUM SERPL-SCNC: 5.2 MMOL/L (ref 3.4–5.3)
PR INTERVAL - MUSE: NORMAL MS
PR INTERVAL - MUSE: NORMAL MS
PROT SERPL-MCNC: 6.9 G/DL (ref 6.4–8.3)
QRS DURATION - MUSE: 84 MS
QRS DURATION - MUSE: 90 MS
QT - MUSE: 392 MS
QT - MUSE: 410 MS
QTC - MUSE: 402 MS
QTC - MUSE: 469 MS
R AXIS - MUSE: 0 DEGREES
R AXIS - MUSE: 42 DEGREES
RADIOLOGIST FLAGS: ABNORMAL
RADIOLOGIST FLAGS: NORMAL
RBC # BLD AUTO: 3.99 10E6/UL (ref 3.8–5.2)
RBC # BLD AUTO: 4.29 10E6/UL (ref 3.8–5.2)
RBC URINE: 0 /HPF
RBC URINE: 1 /HPF
SAO2 % BLDV: 88 % (ref 70–75)
SODIUM BLD-SCNC: 140 MMOL/L (ref 135–145)
SODIUM SERPL-SCNC: 132 MMOL/L (ref 135–145)
SODIUM SERPL-SCNC: 135 MMOL/L (ref 135–145)
SODIUM SERPL-SCNC: 136 MMOL/L (ref 135–145)
SP GR UR STRIP: 1.01 (ref 1–1.03)
SP GR UR STRIP: 1.02 (ref 1–1.03)
SQUAMOUS EPITHELIAL: 7 /HPF
SYSTOLIC BLOOD PRESSURE - MUSE: NORMAL MMHG
SYSTOLIC BLOOD PRESSURE - MUSE: NORMAL MMHG
T AXIS - MUSE: 53 DEGREES
T AXIS - MUSE: 88 DEGREES
TROPONIN T SERPL HS-MCNC: 18 NG/L
TROPONIN T SERPL HS-MCNC: 29 NG/L
TROPONIN T SERPL HS-MCNC: 29 NG/L
TROPONIN T SERPL HS-MCNC: 33 NG/L
TROPONIN T SERPL HS-MCNC: 33 NG/L
UROBILINOGEN UR STRIP-MCNC: NORMAL MG/DL
UROBILINOGEN UR STRIP-MCNC: NORMAL MG/DL
VENTRICULAR RATE- MUSE: 58 BPM
VENTRICULAR RATE- MUSE: 86 BPM
WBC # BLD AUTO: 10.1 10E3/UL (ref 4–11)
WBC # BLD AUTO: 12.9 10E3/UL (ref 4–11)
WBC URINE: 3 /HPF
WBC URINE: 4 /HPF

## 2024-01-01 PROCEDURE — 99285 EMERGENCY DEPT VISIT HI MDM: CPT | Mod: 25

## 2024-01-01 PROCEDURE — 258N000003 HC RX IP 258 OP 636: Performed by: HOSPITALIST

## 2024-01-01 PROCEDURE — 250N000013 HC RX MED GY IP 250 OP 250 PS 637: Performed by: EMERGENCY MEDICINE

## 2024-01-01 PROCEDURE — 99418 PROLNG IP/OBS E/M EA 15 MIN: CPT | Performed by: NURSE PRACTITIONER

## 2024-01-01 PROCEDURE — 99223 1ST HOSP IP/OBS HIGH 75: CPT | Mod: AI | Performed by: HOSPITALIST

## 2024-01-01 PROCEDURE — 83605 ASSAY OF LACTIC ACID: CPT | Performed by: EMERGENCY MEDICINE

## 2024-01-01 PROCEDURE — 96361 HYDRATE IV INFUSION ADD-ON: CPT

## 2024-01-01 PROCEDURE — 81001 URINALYSIS AUTO W/SCOPE: CPT | Mod: ORL | Performed by: PHYSICIAN ASSISTANT

## 2024-01-01 PROCEDURE — 250N000011 HC RX IP 250 OP 636: Performed by: NURSE PRACTITIONER

## 2024-01-01 PROCEDURE — 99207 PR NO BILLABLE SERVICE THIS VISIT: CPT | Performed by: INTERNAL MEDICINE

## 2024-01-01 PROCEDURE — G0378 HOSPITAL OBSERVATION PER HR: HCPCS

## 2024-01-01 PROCEDURE — 250N000013 HC RX MED GY IP 250 OP 250 PS 637: Performed by: INTERNAL MEDICINE

## 2024-01-01 PROCEDURE — 93005 ELECTROCARDIOGRAM TRACING: CPT

## 2024-01-01 PROCEDURE — 87086 URINE CULTURE/COLONY COUNT: CPT | Mod: ORL | Performed by: PHYSICIAN ASSISTANT

## 2024-01-01 PROCEDURE — 92526 ORAL FUNCTION THERAPY: CPT | Mod: GN

## 2024-01-01 PROCEDURE — 250N000009 HC RX 250: Performed by: EMERGENCY MEDICINE

## 2024-01-01 PROCEDURE — 70450 CT HEAD/BRAIN W/O DYE: CPT | Mod: MA

## 2024-01-01 PROCEDURE — 99238 HOSP IP/OBS DSCHRG MGMT 30/<: CPT

## 2024-01-01 PROCEDURE — 82805 BLOOD GASES W/O2 SATURATION: CPT | Performed by: EMERGENCY MEDICINE

## 2024-01-01 PROCEDURE — 99223 1ST HOSP IP/OBS HIGH 75: CPT | Performed by: NURSE PRACTITIONER

## 2024-01-01 PROCEDURE — 99238 HOSP IP/OBS DSCHRG MGMT 30/<: CPT | Performed by: STUDENT IN AN ORGANIZED HEALTH CARE EDUCATION/TRAINING PROGRAM

## 2024-01-01 PROCEDURE — 83605 ASSAY OF LACTIC ACID: CPT | Performed by: HOSPITALIST

## 2024-01-01 PROCEDURE — 70450 CT HEAD/BRAIN W/O DYE: CPT | Mod: MG

## 2024-01-01 PROCEDURE — 85730 THROMBOPLASTIN TIME PARTIAL: CPT | Performed by: EMERGENCY MEDICINE

## 2024-01-01 PROCEDURE — 85610 PROTHROMBIN TIME: CPT | Performed by: EMERGENCY MEDICINE

## 2024-01-01 PROCEDURE — 99223 1ST HOSP IP/OBS HIGH 75: CPT | Performed by: PHYSICIAN ASSISTANT

## 2024-01-01 PROCEDURE — 80047 BASIC METABLC PNL IONIZED CA: CPT

## 2024-01-01 PROCEDURE — 84484 ASSAY OF TROPONIN QUANT: CPT | Performed by: EMERGENCY MEDICINE

## 2024-01-01 PROCEDURE — 258N000003 HC RX IP 258 OP 636: Performed by: EMERGENCY MEDICINE

## 2024-01-01 PROCEDURE — 84484 ASSAY OF TROPONIN QUANT: CPT | Performed by: HOSPITALIST

## 2024-01-01 PROCEDURE — 250N000013 HC RX MED GY IP 250 OP 250 PS 637

## 2024-01-01 PROCEDURE — 96360 HYDRATION IV INFUSION INIT: CPT | Mod: 59

## 2024-01-01 PROCEDURE — 250N000011 HC RX IP 250 OP 636: Performed by: EMERGENCY MEDICINE

## 2024-01-01 PROCEDURE — 80053 COMPREHEN METABOLIC PANEL: CPT | Performed by: EMERGENCY MEDICINE

## 2024-01-01 PROCEDURE — 36415 COLL VENOUS BLD VENIPUNCTURE: CPT | Performed by: EMERGENCY MEDICINE

## 2024-01-01 PROCEDURE — 85048 AUTOMATED LEUKOCYTE COUNT: CPT | Performed by: EMERGENCY MEDICINE

## 2024-01-01 PROCEDURE — 120N000001 HC R&B MED SURG/OB

## 2024-01-01 PROCEDURE — 36415 COLL VENOUS BLD VENIPUNCTURE: CPT | Performed by: HOSPITALIST

## 2024-01-01 PROCEDURE — 999N000157 HC STATISTIC RCP TIME EA 10 MIN

## 2024-01-01 PROCEDURE — 92610 EVALUATE SWALLOWING FUNCTION: CPT | Mod: GN

## 2024-01-01 PROCEDURE — 999N000147 HC STATISTIC PT IP EVAL DEFER: Performed by: PHYSICAL THERAPIST

## 2024-01-01 PROCEDURE — 80048 BASIC METABOLIC PNL TOTAL CA: CPT | Performed by: INTERNAL MEDICINE

## 2024-01-01 PROCEDURE — 85025 COMPLETE CBC W/AUTO DIFF WBC: CPT | Performed by: EMERGENCY MEDICINE

## 2024-01-01 PROCEDURE — 82803 BLOOD GASES ANY COMBINATION: CPT

## 2024-01-01 PROCEDURE — 99207 PR NO CHARGE LOS: CPT | Performed by: NURSE PRACTITIONER

## 2024-01-01 PROCEDURE — 81001 URINALYSIS AUTO W/SCOPE: CPT | Performed by: EMERGENCY MEDICINE

## 2024-01-01 PROCEDURE — 71250 CT THORAX DX C-: CPT | Mod: MG

## 2024-01-01 PROCEDURE — 80048 BASIC METABOLIC PNL TOTAL CA: CPT | Performed by: EMERGENCY MEDICINE

## 2024-01-01 RX ORDER — LOSARTAN POTASSIUM 50 MG/1
25 TABLET ORAL EVERY EVENING
COMMUNITY

## 2024-01-01 RX ORDER — TRAMADOL HYDROCHLORIDE 50 MG/1
50 TABLET ORAL EVERY MORNING
Status: ON HOLD | COMMUNITY
End: 2024-01-01

## 2024-01-01 RX ORDER — ATROPINE SULFATE 10 MG/ML
2 SOLUTION/ DROPS OPHTHALMIC EVERY 4 HOURS PRN
Status: DISCONTINUED | OUTPATIENT
Start: 2024-01-01 | End: 2024-01-01 | Stop reason: HOSPADM

## 2024-01-01 RX ORDER — CARBIDOPA AND LEVODOPA 25; 100 MG/1; MG/1
0.5 TABLET ORAL AT BEDTIME
COMMUNITY

## 2024-01-01 RX ORDER — BISACODYL 10 MG
10 SUPPOSITORY, RECTAL RECTAL
Status: DISCONTINUED | OUTPATIENT
Start: 2024-03-03 | End: 2024-01-01 | Stop reason: HOSPADM

## 2024-01-01 RX ORDER — NALOXONE HYDROCHLORIDE 0.4 MG/ML
0.2 INJECTION, SOLUTION INTRAMUSCULAR; INTRAVENOUS; SUBCUTANEOUS
Status: DISCONTINUED | OUTPATIENT
Start: 2024-01-01 | End: 2024-01-01 | Stop reason: HOSPADM

## 2024-01-01 RX ORDER — CETIRIZINE HYDROCHLORIDE 5 MG/1
5 TABLET ORAL DAILY
COMMUNITY

## 2024-01-01 RX ORDER — ACETAMINOPHEN 325 MG/1
650 TABLET ORAL EVERY 4 HOURS PRN
Status: DISCONTINUED | OUTPATIENT
Start: 2024-01-01 | End: 2024-01-01 | Stop reason: HOSPADM

## 2024-01-01 RX ORDER — MORPHINE SULFATE 2 MG/ML
1 INJECTION, SOLUTION INTRAMUSCULAR; INTRAVENOUS EVERY 4 HOURS
Status: DISCONTINUED | OUTPATIENT
Start: 2024-01-01 | End: 2024-01-01 | Stop reason: HOSPADM

## 2024-01-01 RX ORDER — LIDOCAINE 40 MG/G
CREAM TOPICAL 3 TIMES DAILY
COMMUNITY

## 2024-01-01 RX ORDER — ONDANSETRON 4 MG/1
4 TABLET, ORALLY DISINTEGRATING ORAL EVERY 6 HOURS PRN
Status: DISCONTINUED | OUTPATIENT
Start: 2024-01-01 | End: 2024-01-01 | Stop reason: HOSPADM

## 2024-01-01 RX ORDER — SERTRALINE HYDROCHLORIDE 100 MG/1
100 TABLET, FILM COATED ORAL DAILY
COMMUNITY

## 2024-01-01 RX ORDER — METOPROLOL SUCCINATE 50 MG/1
50 TABLET, EXTENDED RELEASE ORAL 2 TIMES DAILY
COMMUNITY

## 2024-01-01 RX ORDER — NALOXONE HYDROCHLORIDE 0.4 MG/ML
0.4 INJECTION, SOLUTION INTRAMUSCULAR; INTRAVENOUS; SUBCUTANEOUS
Status: DISCONTINUED | OUTPATIENT
Start: 2024-01-01 | End: 2024-01-01 | Stop reason: HOSPADM

## 2024-01-01 RX ORDER — PRAMIPEXOLE DIHYDROCHLORIDE 0.12 MG/1
0.12 TABLET ORAL EVERY EVENING
COMMUNITY

## 2024-01-01 RX ORDER — CARBIDOPA AND LEVODOPA 25; 100 MG/1; MG/1
0.5 TABLET ORAL DAILY PRN
COMMUNITY

## 2024-01-01 RX ORDER — LORAZEPAM 2 MG/ML
1 INJECTION INTRAMUSCULAR
Status: DISCONTINUED | OUTPATIENT
Start: 2024-01-01 | End: 2024-01-01 | Stop reason: HOSPADM

## 2024-01-01 RX ORDER — LORAZEPAM 0.5 MG/1
0.5 TABLET ORAL ONCE
Status: DISCONTINUED | OUTPATIENT
Start: 2024-01-01 | End: 2024-01-01

## 2024-01-01 RX ORDER — LORAZEPAM 2 MG/ML
0.5 INJECTION INTRAMUSCULAR EVERY 4 HOURS
Status: DISCONTINUED | OUTPATIENT
Start: 2024-01-01 | End: 2024-01-01 | Stop reason: HOSPADM

## 2024-01-01 RX ORDER — ACETAMINOPHEN 500 MG
1000 TABLET ORAL 3 TIMES DAILY
Status: DISCONTINUED | OUTPATIENT
Start: 2024-01-01 | End: 2024-01-01 | Stop reason: HOSPADM

## 2024-01-01 RX ORDER — ONDANSETRON 2 MG/ML
4 INJECTION INTRAMUSCULAR; INTRAVENOUS EVERY 6 HOURS PRN
Status: DISCONTINUED | OUTPATIENT
Start: 2024-01-01 | End: 2024-01-01 | Stop reason: HOSPADM

## 2024-01-01 RX ORDER — TRAMADOL HYDROCHLORIDE 50 MG/1
25 TABLET ORAL 3 TIMES DAILY
COMMUNITY

## 2024-01-01 RX ORDER — HYDROXYZINE HYDROCHLORIDE 25 MG/1
25 TABLET, FILM COATED ORAL ONCE
Status: COMPLETED | OUTPATIENT
Start: 2024-01-01 | End: 2024-01-01

## 2024-01-01 RX ORDER — AMOXICILLIN 250 MG
1 CAPSULE ORAL 2 TIMES DAILY PRN
Status: DISCONTINUED | OUTPATIENT
Start: 2024-01-01 | End: 2024-01-01 | Stop reason: HOSPADM

## 2024-01-01 RX ORDER — LOPERAMIDE HCL 2 MG
2 CAPSULE ORAL 2 TIMES DAILY PRN
COMMUNITY

## 2024-01-01 RX ORDER — LORAZEPAM 1 MG/1
1 TABLET ORAL
Status: DISCONTINUED | OUTPATIENT
Start: 2024-01-01 | End: 2024-01-01 | Stop reason: HOSPADM

## 2024-01-01 RX ORDER — POLYETHYLENE GLYCOL 3350 17 G/17G
8.5 POWDER, FOR SOLUTION ORAL DAILY PRN
Status: DISCONTINUED | OUTPATIENT
Start: 2024-01-01 | End: 2024-01-01 | Stop reason: HOSPADM

## 2024-01-01 RX ORDER — MORPHINE SULFATE 2 MG/ML
1 INJECTION, SOLUTION INTRAMUSCULAR; INTRAVENOUS
Status: DISCONTINUED | OUTPATIENT
Start: 2024-01-01 | End: 2024-01-01 | Stop reason: HOSPADM

## 2024-01-01 RX ORDER — FUROSEMIDE 40 MG
40 TABLET ORAL DAILY
Status: DISCONTINUED | OUTPATIENT
Start: 2024-01-01 | End: 2024-01-01 | Stop reason: HOSPADM

## 2024-01-01 RX ORDER — SODIUM CHLORIDE 9 MG/ML
INJECTION, SOLUTION INTRAVENOUS CONTINUOUS
Status: DISCONTINUED | OUTPATIENT
Start: 2024-01-01 | End: 2024-01-01

## 2024-01-01 RX ORDER — CARBIDOPA AND LEVODOPA 50; 200 MG/1; MG/1
1 TABLET, EXTENDED RELEASE ORAL AT BEDTIME
Status: DISCONTINUED | OUTPATIENT
Start: 2024-01-01 | End: 2024-01-01 | Stop reason: HOSPADM

## 2024-01-01 RX ORDER — L. ACIDOPHILUS/PECTIN, CITRUS 25MM-100MG
1 TABLET ORAL DAILY
COMMUNITY
End: 2024-01-01

## 2024-01-01 RX ORDER — POTASSIUM CHLORIDE 750 MG/1
20 CAPSULE, EXTENDED RELEASE ORAL 2 TIMES DAILY
COMMUNITY
End: 2024-01-01

## 2024-01-01 RX ORDER — OXYBUTYNIN CHLORIDE 5 MG/1
5 TABLET ORAL AT BEDTIME
COMMUNITY

## 2024-01-01 RX ORDER — PRAMIPEXOLE DIHYDROCHLORIDE 0.12 MG/1
0.12 TABLET ORAL
Status: ON HOLD | COMMUNITY
End: 2024-01-01

## 2024-01-01 RX ORDER — NALOXONE HYDROCHLORIDE 0.4 MG/ML
0.1 INJECTION, SOLUTION INTRAMUSCULAR; INTRAVENOUS; SUBCUTANEOUS
Status: CANCELLED | OUTPATIENT
Start: 2024-01-01

## 2024-01-01 RX ORDER — MIRABEGRON 25 MG/1
25 TABLET, FILM COATED, EXTENDED RELEASE ORAL AT BEDTIME
Status: DISCONTINUED | OUTPATIENT
Start: 2024-01-01 | End: 2024-01-01 | Stop reason: HOSPADM

## 2024-01-01 RX ORDER — LOSARTAN POTASSIUM 50 MG/1
50 TABLET ORAL EVERY MORNING
COMMUNITY

## 2024-01-01 RX ORDER — ALLOPURINOL 100 MG/1
100 TABLET ORAL AT BEDTIME
Status: DISCONTINUED | OUTPATIENT
Start: 2024-01-01 | End: 2024-01-01 | Stop reason: HOSPADM

## 2024-01-01 RX ORDER — LIDOCAINE 40 MG/G
CREAM TOPICAL
Status: DISCONTINUED | OUTPATIENT
Start: 2024-01-01 | End: 2024-01-01 | Stop reason: HOSPADM

## 2024-01-01 RX ORDER — POLYETHYLENE GLYCOL 3350 17 G/17G
8.5 POWDER, FOR SOLUTION ORAL EVERY OTHER DAY
Status: DISCONTINUED | OUTPATIENT
Start: 2024-01-01 | End: 2024-01-01 | Stop reason: HOSPADM

## 2024-01-01 RX ORDER — SERTRALINE HYDROCHLORIDE 100 MG/1
100 TABLET, FILM COATED ORAL DAILY
Status: DISCONTINUED | OUTPATIENT
Start: 2024-01-01 | End: 2024-01-01 | Stop reason: HOSPADM

## 2024-01-01 RX ORDER — LORAZEPAM 0.5 MG/1
0.5 TABLET ORAL EVERY 4 HOURS
Status: DISCONTINUED | OUTPATIENT
Start: 2024-01-01 | End: 2024-01-01

## 2024-01-01 RX ORDER — MIRABEGRON 25 MG/1
25 TABLET, FILM COATED, EXTENDED RELEASE ORAL AT BEDTIME
COMMUNITY
End: 2024-01-01

## 2024-01-01 RX ORDER — CHOLECALCIFEROL (VITAMIN D3) 50 MCG
50 TABLET ORAL DAILY
COMMUNITY

## 2024-01-01 RX ORDER — FUROSEMIDE 40 MG
40 TABLET ORAL DAILY
COMMUNITY

## 2024-01-01 RX ORDER — POLYETHYLENE GLYCOL 3350 17 G/17G
8.5 POWDER, FOR SOLUTION ORAL EVERY OTHER DAY
COMMUNITY

## 2024-01-01 RX ORDER — DOXEPIN 3 MG/1
3 TABLET, FILM COATED ORAL AT BEDTIME
COMMUNITY

## 2024-01-01 RX ORDER — NALOXONE HYDROCHLORIDE 0.4 MG/ML
0.2 INJECTION, SOLUTION INTRAMUSCULAR; INTRAVENOUS; SUBCUTANEOUS
Status: CANCELLED | OUTPATIENT
Start: 2024-01-01

## 2024-01-01 RX ORDER — DOXEPIN 3 MG/1
3 TABLET, FILM COATED ORAL AT BEDTIME
Status: DISCONTINUED | OUTPATIENT
Start: 2024-01-01 | End: 2024-01-01 | Stop reason: HOSPADM

## 2024-01-01 RX ORDER — ACETAMINOPHEN 650 MG/1
650 SUPPOSITORY RECTAL EVERY 4 HOURS PRN
Status: DISCONTINUED | OUTPATIENT
Start: 2024-01-01 | End: 2024-01-01 | Stop reason: HOSPADM

## 2024-01-01 RX ORDER — MIRTAZAPINE 7.5 MG/1
7.5 TABLET, FILM COATED ORAL AT BEDTIME
COMMUNITY

## 2024-01-01 RX ORDER — CALCIUM CARBONATE 500 MG/1
1 TABLET, CHEWABLE ORAL EVERY 6 HOURS PRN
COMMUNITY

## 2024-01-01 RX ORDER — GLYCOPYRROLATE 0.2 MG/ML
0.2 INJECTION, SOLUTION INTRAMUSCULAR; INTRAVENOUS EVERY 4 HOURS PRN
Status: DISCONTINUED | OUTPATIENT
Start: 2024-01-01 | End: 2024-01-01 | Stop reason: HOSPADM

## 2024-01-01 RX ORDER — CARBIDOPA AND LEVODOPA 25; 100 MG/1; MG/1
1 TABLET ORAL 3 TIMES DAILY
Status: DISCONTINUED | OUTPATIENT
Start: 2024-01-01 | End: 2024-01-01 | Stop reason: HOSPADM

## 2024-01-01 RX ORDER — IOPAMIDOL 755 MG/ML
500 INJECTION, SOLUTION INTRAVASCULAR ONCE
Status: COMPLETED | OUTPATIENT
Start: 2024-01-01 | End: 2024-01-01

## 2024-01-01 RX ORDER — GABAPENTIN 100 MG/1
100 CAPSULE ORAL 3 TIMES DAILY
Status: DISCONTINUED | OUTPATIENT
Start: 2024-01-01 | End: 2024-01-01 | Stop reason: HOSPADM

## 2024-01-01 RX ORDER — HYDROXYZINE HYDROCHLORIDE 25 MG/1
25 TABLET, FILM COATED ORAL EVERY 8 HOURS PRN
COMMUNITY

## 2024-01-01 RX ORDER — NYSTATIN 100000 [USP'U]/G
POWDER TOPICAL 2 TIMES DAILY PRN
COMMUNITY

## 2024-01-01 RX ORDER — METOPROLOL SUCCINATE 50 MG/1
50 TABLET, EXTENDED RELEASE ORAL 2 TIMES DAILY
Status: DISCONTINUED | OUTPATIENT
Start: 2024-01-01 | End: 2024-01-01 | Stop reason: HOSPADM

## 2024-01-01 RX ORDER — ACETAMINOPHEN 500 MG
1000 TABLET ORAL 3 TIMES DAILY
COMMUNITY

## 2024-01-01 RX ORDER — BENZOCAINE/MENTHOL 6 MG-10 MG
LOZENGE MUCOUS MEMBRANE DAILY PRN
COMMUNITY

## 2024-01-01 RX ORDER — FLUTICASONE PROPIONATE 50 MCG
1 SPRAY, SUSPENSION (ML) NASAL DAILY
COMMUNITY

## 2024-01-01 RX ORDER — DIAZEPAM 10 MG/2ML
2.5 INJECTION, SOLUTION INTRAMUSCULAR; INTRAVENOUS EVERY 10 MIN PRN
Status: DISCONTINUED | OUTPATIENT
Start: 2024-01-01 | End: 2024-01-01 | Stop reason: HOSPADM

## 2024-01-01 RX ORDER — CARBIDOPA AND LEVODOPA 25; 100 MG/1; MG/1
1 TABLET ORAL 3 TIMES DAILY
COMMUNITY

## 2024-01-01 RX ORDER — ACETAMINOPHEN 650 MG/1
650 SUPPOSITORY RECTAL EVERY 6 HOURS PRN
Status: DISCONTINUED | OUTPATIENT
Start: 2024-01-01 | End: 2024-01-01 | Stop reason: HOSPADM

## 2024-01-01 RX ORDER — SIMETHICONE 125 MG
125 TABLET,CHEWABLE ORAL 4 TIMES DAILY PRN
COMMUNITY

## 2024-01-01 RX ORDER — CRANBERRY FRUIT EXTRACT 250 MG
500 CAPSULE ORAL DAILY
COMMUNITY

## 2024-01-01 RX ORDER — POTASSIUM CHLORIDE 1.5 G/1.58G
20 POWDER, FOR SOLUTION ORAL 2 TIMES DAILY
COMMUNITY

## 2024-01-01 RX ORDER — HALOPERIDOL 5 MG/ML
1 INJECTION INTRAMUSCULAR
Status: DISCONTINUED | OUTPATIENT
Start: 2024-01-01 | End: 2024-01-01 | Stop reason: HOSPADM

## 2024-01-01 RX ORDER — POLYETHYLENE GLYCOL 3350 17 G/17G
17 POWDER, FOR SOLUTION ORAL 2 TIMES DAILY PRN
Status: DISCONTINUED | OUTPATIENT
Start: 2024-01-01 | End: 2024-01-01

## 2024-01-01 RX ORDER — TRAZODONE HYDROCHLORIDE 50 MG/1
50 TABLET, FILM COATED ORAL ONCE
Status: DISCONTINUED | OUTPATIENT
Start: 2024-01-01 | End: 2024-01-01

## 2024-01-01 RX ORDER — LOSARTAN POTASSIUM 25 MG/1
25 TABLET ORAL EVERY EVENING
COMMUNITY
End: 2024-01-01

## 2024-01-01 RX ORDER — AMOXICILLIN 250 MG
2 CAPSULE ORAL 2 TIMES DAILY PRN
Status: DISCONTINUED | OUTPATIENT
Start: 2024-01-01 | End: 2024-01-01 | Stop reason: HOSPADM

## 2024-01-01 RX ORDER — PRAMIPEXOLE DIHYDROCHLORIDE 0.12 MG/1
0.12 TABLET ORAL EVERY EVENING
Status: DISCONTINUED | OUTPATIENT
Start: 2024-01-01 | End: 2024-01-01 | Stop reason: HOSPADM

## 2024-01-01 RX ORDER — POLYETHYLENE GLYCOL 3350 17 G/17G
8.5 POWDER, FOR SOLUTION ORAL DAILY PRN
Status: ON HOLD | COMMUNITY
End: 2024-01-01

## 2024-01-01 RX ORDER — CARBIDOPA AND LEVODOPA 50; 200 MG/1; MG/1
1 TABLET, EXTENDED RELEASE ORAL AT BEDTIME
COMMUNITY

## 2024-01-01 RX ORDER — GABAPENTIN 100 MG/1
100 CAPSULE ORAL 3 TIMES DAILY
COMMUNITY

## 2024-01-01 RX ORDER — MORPHINE SULFATE 2 MG/ML
2 INJECTION, SOLUTION INTRAMUSCULAR; INTRAVENOUS
Status: DISCONTINUED | OUTPATIENT
Start: 2024-01-01 | End: 2024-01-01 | Stop reason: HOSPADM

## 2024-01-01 RX ORDER — LOSARTAN POTASSIUM 50 MG/1
50 TABLET ORAL EVERY MORNING
Status: DISCONTINUED | OUTPATIENT
Start: 2024-01-01 | End: 2024-01-01 | Stop reason: HOSPADM

## 2024-01-01 RX ORDER — TRAMADOL HYDROCHLORIDE 50 MG/1
25 TABLET ORAL 2 TIMES DAILY
Status: ON HOLD | COMMUNITY
End: 2024-01-01

## 2024-01-01 RX ORDER — ALLOPURINOL 100 MG/1
100 TABLET ORAL AT BEDTIME
COMMUNITY

## 2024-01-01 RX ORDER — TRAZODONE HYDROCHLORIDE 50 MG/1
50 TABLET, FILM COATED ORAL AT BEDTIME
COMMUNITY
End: 2024-01-01

## 2024-01-01 RX ADMIN — ACETAMINOPHEN 1000 MG: 500 TABLET, FILM COATED ORAL at 13:12

## 2024-01-01 RX ADMIN — ACETAMINOPHEN 1000 MG: 500 TABLET, FILM COATED ORAL at 09:43

## 2024-01-01 RX ADMIN — METOPROLOL SUCCINATE 50 MG: 50 TABLET, EXTENDED RELEASE ORAL at 20:24

## 2024-01-01 RX ADMIN — SERTRALINE HYDROCHLORIDE 100 MG: 100 TABLET ORAL at 09:50

## 2024-01-01 RX ADMIN — LOSARTAN POTASSIUM 50 MG: 50 TABLET, FILM COATED ORAL at 09:51

## 2024-01-01 RX ADMIN — SODIUM CHLORIDE, POTASSIUM CHLORIDE, SODIUM LACTATE AND CALCIUM CHLORIDE 1000 ML: 600; 310; 30; 20 INJECTION, SOLUTION INTRAVENOUS at 22:06

## 2024-01-01 RX ADMIN — MORPHINE SULFATE 1 MG: 2 INJECTION, SOLUTION INTRAMUSCULAR; INTRAVENOUS at 01:51

## 2024-01-01 RX ADMIN — MIRABEGRON 25 MG: 25 TABLET, FILM COATED, EXTENDED RELEASE ORAL at 21:16

## 2024-01-01 RX ADMIN — METOPROLOL SUCCINATE 50 MG: 50 TABLET, EXTENDED RELEASE ORAL at 09:54

## 2024-01-01 RX ADMIN — HYDROXYZINE HYDROCHLORIDE 25 MG: 25 TABLET, FILM COATED ORAL at 00:40

## 2024-01-01 RX ADMIN — SODIUM CHLORIDE: 9 INJECTION, SOLUTION INTRAVENOUS at 17:24

## 2024-01-01 RX ADMIN — CARBIDOPA AND LEVODOPA 1 TABLET: 25; 100 TABLET ORAL at 09:47

## 2024-01-01 RX ADMIN — SODIUM CHLORIDE, POTASSIUM CHLORIDE, SODIUM LACTATE AND CALCIUM CHLORIDE 1000 ML: 600; 310; 30; 20 INJECTION, SOLUTION INTRAVENOUS at 19:41

## 2024-01-01 RX ADMIN — GABAPENTIN 100 MG: 100 CAPSULE ORAL at 09:49

## 2024-01-01 RX ADMIN — FUROSEMIDE 40 MG: 40 TABLET ORAL at 09:54

## 2024-01-01 RX ADMIN — LORAZEPAM 0.5 MG: 2 INJECTION INTRAMUSCULAR; INTRAVENOUS at 04:13

## 2024-01-01 RX ADMIN — IOPAMIDOL 500 ML: 755 INJECTION, SOLUTION INTRAVENOUS at 07:54

## 2024-01-01 RX ADMIN — ACETAMINOPHEN 1000 MG: 500 TABLET, FILM COATED ORAL at 14:30

## 2024-01-01 RX ADMIN — SODIUM CHLORIDE: 9 INJECTION, SOLUTION INTRAVENOUS at 06:18

## 2024-01-01 RX ADMIN — SODIUM CHLORIDE: 9 INJECTION, SOLUTION INTRAVENOUS at 05:23

## 2024-01-01 RX ADMIN — LORAZEPAM 0.5 MG: 2 INJECTION INTRAMUSCULAR; INTRAVENOUS at 20:36

## 2024-01-01 RX ADMIN — TRAMADOL HYDROCHLORIDE 25 MG: 50 TABLET ORAL at 09:46

## 2024-01-01 RX ADMIN — GABAPENTIN 100 MG: 100 CAPSULE ORAL at 14:30

## 2024-01-01 RX ADMIN — MORPHINE SULFATE 1 MG: 2 INJECTION, SOLUTION INTRAMUSCULAR; INTRAVENOUS at 10:31

## 2024-01-01 RX ADMIN — ACETAMINOPHEN 1000 MG: 500 TABLET, FILM COATED ORAL at 20:25

## 2024-01-01 RX ADMIN — LORAZEPAM 0.5 MG: 2 INJECTION INTRAMUSCULAR; INTRAVENOUS at 23:42

## 2024-01-01 RX ADMIN — PRAMIPEXOLE DIHYDROCHLORIDE 0.12 MG: 0.12 TABLET ORAL at 20:24

## 2024-01-01 RX ADMIN — GLYCOPYRROLATE 0.2 MG: 0.2 INJECTION INTRAMUSCULAR; INTRAVENOUS at 01:53

## 2024-01-01 RX ADMIN — METOPROLOL SUCCINATE 50 MG: 50 TABLET, EXTENDED RELEASE ORAL at 13:12

## 2024-01-01 RX ADMIN — APIXABAN 5 MG: 5 TABLET, FILM COATED ORAL at 20:25

## 2024-01-01 RX ADMIN — SODIUM CHLORIDE 100 ML: 9 INJECTION, SOLUTION INTRAVENOUS at 07:55

## 2024-01-01 RX ADMIN — MORPHINE SULFATE 1 MG: 2 INJECTION, SOLUTION INTRAMUSCULAR; INTRAVENOUS at 06:16

## 2024-01-01 RX ADMIN — LORAZEPAM 0.5 MG: 2 INJECTION INTRAMUSCULAR; INTRAVENOUS at 08:22

## 2024-01-01 RX ADMIN — CARBIDOPA AND LEVODOPA 1 TABLET: 50; 200 TABLET, EXTENDED RELEASE ORAL at 21:16

## 2024-01-01 RX ADMIN — CARBIDOPA AND LEVODOPA 1 TABLET: 25; 100 TABLET ORAL at 13:12

## 2024-01-01 RX ADMIN — MORPHINE SULFATE 2 MG: 2 INJECTION, SOLUTION INTRAMUSCULAR; INTRAVENOUS at 13:48

## 2024-01-01 RX ADMIN — APIXABAN 5 MG: 5 TABLET, FILM COATED ORAL at 09:54

## 2024-01-01 RX ADMIN — CARBIDOPA AND LEVODOPA 1 TABLET: 25; 100 TABLET ORAL at 14:31

## 2024-01-01 RX ADMIN — POLYETHYLENE GLYCOL 3350 8.5 G: 17 POWDER, FOR SOLUTION ORAL at 13:14

## 2024-01-01 RX ADMIN — SERTRALINE HYDROCHLORIDE 100 MG: 100 TABLET ORAL at 13:12

## 2024-01-01 RX ADMIN — APIXABAN 5 MG: 5 TABLET, FILM COATED ORAL at 13:12

## 2024-01-01 RX ADMIN — ALLOPURINOL 100 MG: 100 TABLET ORAL at 21:16

## 2024-01-01 RX ADMIN — MORPHINE SULFATE 1 MG: 2 INJECTION, SOLUTION INTRAMUSCULAR; INTRAVENOUS at 20:36

## 2024-01-01 RX ADMIN — SIMETHICONE 226 ML: 125 CAPSULE, LIQUID FILLED ORAL at 00:53

## 2024-01-01 RX ADMIN — CARBIDOPA AND LEVODOPA 1 TABLET: 25; 100 TABLET ORAL at 20:24

## 2024-01-01 RX ADMIN — LORAZEPAM 0.5 MG: 2 INJECTION INTRAMUSCULAR; INTRAVENOUS at 16:58

## 2024-01-01 RX ADMIN — MORPHINE SULFATE 1 MG: 2 INJECTION, SOLUTION INTRAMUSCULAR; INTRAVENOUS at 16:59

## 2024-01-01 RX ADMIN — LORAZEPAM 0.5 MG: 2 INJECTION INTRAMUSCULAR; INTRAVENOUS at 12:33

## 2024-01-01 ASSESSMENT — ACTIVITIES OF DAILY LIVING (ADL)
ADLS_ACUITY_SCORE: 39
ADLS_ACUITY_SCORE: 51
ADLS_ACUITY_SCORE: 47
ADLS_ACUITY_SCORE: 55
ADLS_ACUITY_SCORE: 47
ADLS_ACUITY_SCORE: 39
ADLS_ACUITY_SCORE: 39
ADLS_ACUITY_SCORE: 43
ADLS_ACUITY_SCORE: 51
ADLS_ACUITY_SCORE: 47
ADLS_ACUITY_SCORE: 39
ADLS_ACUITY_SCORE: 51
ADLS_ACUITY_SCORE: 35
ADLS_ACUITY_SCORE: 55
ADLS_ACUITY_SCORE: 47
ADLS_ACUITY_SCORE: 49
ADLS_ACUITY_SCORE: 47
ADLS_ACUITY_SCORE: 35
ADLS_ACUITY_SCORE: 47
ADLS_ACUITY_SCORE: 51
ADLS_ACUITY_SCORE: 51
ADLS_ACUITY_SCORE: 39
ADLS_ACUITY_SCORE: 35
ADLS_ACUITY_SCORE: 51
ADLS_ACUITY_SCORE: 47
ADLS_ACUITY_SCORE: 39
ADLS_ACUITY_SCORE: 43
ADLS_ACUITY_SCORE: 39
ADLS_ACUITY_SCORE: 43
ADLS_ACUITY_SCORE: 35
ADLS_ACUITY_SCORE: 51
ADLS_ACUITY_SCORE: 49
ADLS_ACUITY_SCORE: 47
ADLS_ACUITY_SCORE: 43
ADLS_ACUITY_SCORE: 51
ADLS_ACUITY_SCORE: 35
ADLS_ACUITY_SCORE: 47
ADLS_ACUITY_SCORE: 51
ADLS_ACUITY_SCORE: 35
ADLS_ACUITY_SCORE: 35
ADLS_ACUITY_SCORE: 47
ADLS_ACUITY_SCORE: 51
ADLS_ACUITY_SCORE: 35
ADLS_ACUITY_SCORE: 39
DEPENDENT_IADLS:: CLEANING;COOKING;LAUNDRY;SHOPPING;MEAL PREPARATION;MEDICATION MANAGEMENT;MONEY MANAGEMENT;TRANSPORTATION
ADLS_ACUITY_SCORE: 51
ADLS_ACUITY_SCORE: 49
ADLS_ACUITY_SCORE: 51
ADLS_ACUITY_SCORE: 51
ADLS_ACUITY_SCORE: 49
ADLS_ACUITY_SCORE: 39

## 2024-01-01 ASSESSMENT — COLUMBIA-SUICIDE SEVERITY RATING SCALE - C-SSRS
1. IN THE PAST MONTH, HAVE YOU WISHED YOU WERE DEAD OR WISHED YOU COULD GO TO SLEEP AND NOT WAKE UP?: NO
2. HAVE YOU ACTUALLY HAD ANY THOUGHTS OF KILLING YOURSELF IN THE PAST MONTH?: NO
6. HAVE YOU EVER DONE ANYTHING, STARTED TO DO ANYTHING, OR PREPARED TO DO ANYTHING TO END YOUR LIFE?: NO

## 2024-01-09 PROBLEM — Z86.59 HISTORY OF DEMENTIA: Status: ACTIVE | Noted: 2024-01-01

## 2024-01-09 PROBLEM — R53.1 GENERALIZED WEAKNESS: Status: ACTIVE | Noted: 2024-01-01

## 2024-01-09 PROBLEM — E86.0 DEHYDRATION: Status: ACTIVE | Noted: 2024-01-01

## 2024-01-09 PROBLEM — K80.20 CALCULUS OF GALLBLADDER WITHOUT CHOLECYSTITIS WITHOUT OBSTRUCTION: Status: ACTIVE | Noted: 2024-01-01

## 2024-01-09 PROBLEM — Z86.79 HISTORY OF ATRIAL FIBRILLATION: Status: ACTIVE | Noted: 2024-01-01

## 2024-01-09 PROBLEM — N28.9 RENAL INSUFFICIENCY: Status: ACTIVE | Noted: 2024-01-01

## 2024-01-09 PROBLEM — K56.41 FECAL IMPACTION (H): Status: ACTIVE | Noted: 2024-01-01

## 2024-01-09 PROBLEM — M79.89 SOFT TISSUE MASS: Status: ACTIVE | Noted: 2024-01-01

## 2024-01-09 NOTE — PLAN OF CARE
Goal Outcome Evaluation:     Pt is A & O to self, lift transfer, RA, plan is ; NS @ 75 ml/hr, trend trops, continue sinemet, NPO , speech/ SW/ PT consult.

## 2024-01-09 NOTE — PROGRESS NOTES
"Clinical Swallow Evaluation (CSE):     01/09/24 0830   Appointment Info   Signing Clinician's Name / Credentials (SLP) Gwen Wilson MS CCC-SLP   General Information   Onset of Illness/Injury or Date of Surgery 01/08/24   Referring Physician DO Lemuel   Patient/Family Therapy Goal Statement (SLP) Pt did not state, perseveratively stating \"help me!\"   Pertinent History of Current Problem   Per H&P \"Leola Bui is a 88 year old female with a history of hypertension, chronic diastolic CHF, stage III CKD, chronic atrial fibrillation, and advanced Parkinson's with dementia residing in a memory care unit who presents with failure to thrive.\"     General Observations   Pt alert/consistently responding though largely keeping eyes closed; perseverating on \"help me!\" but redirectable for short period of time.     Pain Assessment   Patient Currently in Pain   (pt did not state)   Type of Evaluation   Type of Evaluation Swallow Evaluation   Oral Motor   Oral Musculature unable to assess due to poor participation/comprehension   Structural Abnormalities none present   Mucosal Quality dry   Vocal Quality/Secretion Management (Oral Motor)   Vocal Quality (Oral Motor) WNL   Secretion Management (Oral Motor) WNL   General Swallowing Observations   Past History of Dysphagia None per EMR   Respiratory Support room air   Current Diet/Method of Nutritional Intake (General Swallowing Observations, NIS) NPO   Swallowing Evaluation Clinical swallow evaluation   Clinical Swallow Evaluation   Feeding Assistance dependent   Clinical Swallow Evaluation Textures Trialed thin liquids;mildly thick liquids;pureed   Clinical Swallow Eval: Thin Liquid Texture Trial   Mode of Presentation, Thin Liquids spoon;straw   Volume of Liquid or Food Presented ice chips x5, tsp x1/straw x1   Oral Phase of Swallow premature pharyngeal entry   Pharyngeal Phase of Swallow impaired;reduction in laryngeal movement;coughing/choking;throat clearing   Diagnostic " Statement WFL tolerance of ice chips; reduced control and supsected pen/asp with immediate throat clear and/or coughing   Clinical Swallow Eval: Mildly Thick Liquids   Mode of Presentation spoon;straw   Volume Presented 2 oz   Oral Phase WFL   Pharyngeal Phase intact   Diagnostic Statement improved control with no overt clinical signs/sx aspiration   Clinical Swallow Evaluation: Puree Solid Texture Trial   Mode of Presentation, Puree spoon;fed by clinician   Volume of Puree Presented x5 bites   Oral Phase, Puree WFL   Pharyngeal Phase, Puree intact   Diagnostic Statement no overt clinical signs/sx aspiration   Swallowing Recommendations   Diet Consistency Recommendations pureed (level 4);mildly thick liquids (level 2)   Supervision Level for Intake 1:1 supervision needed   Mode of Delivery Recommendations bolus size, small;food moistened;slow rate of intake   Swallowing Maneuver Recommendations alternate food and liquid intake   Monitoring/Assistance Required (Eating/Swallowing) check mouth frequently for oral residue/pocketing;cue for finger/lingual sweep if oral pocketing present;stop eating activities when fatigue is present;monitor for cough or change in vocal quality with intake   Recommended Feeding/Eating Techniques (Swallow Eval) maintain upright sitting position for eating;maintain upright posture during/after eating for 30 minutes;minimize distractions during oral intake;moisten oral mucosa prior to intake;provide assist with feeding   Medication Administration Recommendations, Swallowing (SLP) can try whole with puree -- crush if pocketing and/or difficulty   Instrumental Assessment Recommendations instrumental evaluation not recommended at this time   General Therapy Interventions   Planned Therapy Interventions Dysphagia Treatment   Clinical Impression   Criteria for Skilled Therapeutic Interventions Met (SLP Eval) Yes, treatment indicated   SLP Diagnosis mild oral and potential pharyngeal dysphagia    Risks & Benefits of therapy have been explained evaluation/treatment results reviewed;care plan/treatment goals reviewed;risks/benefits reviewed;current/potential barriers reviewed;participants voiced agreement with care plan;participants included;patient  (*needs reinforcement)   Clinical Impression Comments   Clinical swallow evaluation completed with ice chips, thin liquids, mildly thick liquids, puree solids -- pt currently presents with mild oral and potential pharyngeal dysphagia which appears largely related to reduced awareness to PO tasks/swallowing -- chewable solids not attempted today given same/risk. WFL oral acceptance with cues; WFL labial seal/oral containment. Complete oral clearance with puree solids. Suspect reduced oral control/spillage with thin liquids resulting in immediate coughing/throat clearing; improving with mildly thick where no overt signs/sx aspiration noted. Rec: PO initiation with diet modifications, 1:1 suprvision/assist, strategies with close monitoring of tolerance.     SLP Total Evaluation Time   Eval: oral/pharyngeal swallow function, clinical swallow Minutes (90570) 15   SLP Goals   Therapy Frequency (SLP Eval) 5 times/week   SLP Predicted Duration/Target Date for Goal Attainment 01/16/24   SLP Goals Swallow   SLP: Safely tolerate diet without signs/symptoms of aspiration Regular diet;Thin liquids;With use of swallow precautions;With assistance/supervision   SLP Discharge Planning   SLP Plan PO tolerance, ADAT, monitor palliative care/goals   SLP Discharge Recommendation   (return to memory care with SLP services for dysphagia)   SLP Rationale for DC Rec pt below baseline re: swallow function, requiring SLP f/u if goals remain restorative   SLP Brief overview of current status  Puree solids, mildly thick liquids with 1:1 assist, if pt alert/upright/accepting, single bites/sips at a time, liquid wash every 2-3 bites; hold if any coughing/difficulty.   Total Session Time    Total Session Time (sum of timed and untimed services) 15

## 2024-01-09 NOTE — CONSULTS
Palliative Care Consultation Note  [facility]      Patient: Leola Bui  Date of Admission:  2024    Requesting Clinician / Team: hospitalist  Reason for consult: Goals of care  Decisional support  Patient and family support       Recommendations & Counseling     GOALS OF CARE:   Restorative with limits   Currently are talking about goals as a family  They are interested further hospitalizations as needed  Not ready for hospice today    ADVANCE CARE PLANNING:  Patient has an advance directive dated 7/3/2019.  Primary Health Care Agent Rda Hao ().  Alternate(s) Brigdet Bui and Sonja Price.   There is no POLST form on file, defer to patient and/or next of kin for decisions   Code status: No CPR- Do NOT Intubate    MEDICAL MANAGEMENT:   #Delirium  #Agitation  Avoid benzodiazepines, antihistamines, anticholinergics if able.  Lights on and blinds open during the day.  Reorient frequently.  Lights & TV off during the night.  Promote normal circadian rhythm.  Limit sensory deprivation - utilize hearing aids, glasses, etc.  Frequently assess basic needs such as temperature, elimination, thirst/hunger, pain  Consider bedside attendant (if able) for additional safety  Trazodone  Other Doxepin at HS  Melatonin at HS     #General Weakness/Debility   Appreciate the input of therapies for discharge recommendations    #Dysphagia  Speech Pathology Consult   Pureed diet with thick liquids  Family would like to have an order for pushing fluids when at facility     PSYCHOSOCIAL/SPIRITUAL SUPPORT:  Family   [place] community: [employer]     Palliative Care will continue to follow. Thank you for the consult and allowing us to aid in the care of Leola Bui.    These recommendations have been discussed with medical team.    Lexie Menendez NP  Securely message with [employer] (more info)  Text page via [facility] Paging/Directory       Assessment      Leola Bui is a 88 year old female with a past  medical history of HTN, chronic diastolic heart failures, CKD3, chronic atrial fibrillation, parkinson's, dementia- lives in memory cares who presented on 1/8 with failure to thrive.      Today, the patient was seen for:  Goals of care    Palliative Care Summary:   Met with patient and daughter Bridget at the bedside..   I introduced our role as an extra layer of support and how we help patients and families dealing with serious, potentially life-limiting illnesses. I explained the composition of the palliative care team.  Palliative care helps patients and families navigate their care while focusing on the whole person; providing emotional, social and spiritual support  Palliative care often assists with symptom management, information sharing about what to expect from the illness, available treatment options and what effect those options may have on the disease course, and provide effective communication and caring support.    Prognosis, Goals, & Planning:    Functional Status just prior to this current hospitalization:  Patient is wheelchair bound at baseline.   Decreased appetite    Prognosis, Goals, and/or Advance Care Planning:  We discussed general treatment options (full/restorative, selective/conservatives, and comfort only/hospice). We then discussed how these specifically apply to current condition.  Based on this discussion, patient's family has decided to continue with restorative cares.    Code Status was addressed today:   yes        Patient has decision-making capacity today for complex decisions:Unreliable            Coping, Meaning, & Spirituality:   Mood, coping, and/or meaning in the context of serious illness were addressed today: Yes    Social:   Living situation:resides in assisted living memory cares    Medications:  I have reviewed this patient's medication profile and medications from this hospitalization. Notable medications: none     ROS:  Comprehensive ROS is reviewed and is negative  except as here & per HPI:     Physical Exam   Vital Signs with Ranges  Temp:  [96.8  F (36  C)-97.4  F (36.3  C)] 97.4  F (36.3  C)  Pulse:  [65-90] 85  Resp:  [20-24] 20  BP: (117-148)/() 117/96  SpO2:  [90 %-96 %] 96 %  0 lbs 0 oz    PHYSICAL EXAM:  Constitutional: no distress and sleeping between cares   Cardiovascular: negative  Respiratory: negative  Psychiatric: confused  Abdomen: Abdomen soft, non-tender. BS normal. No masses, organomegaly  Pain: no s/s of pain    Data reviewed:  Results for orders placed or performed during the hospital encounter of 01/08/24 (from the past 24 hour(s))   EKG 12-lead, tracing only   Result Value Ref Range    Systolic Blood Pressure  mmHg    Diastolic Blood Pressure  mmHg    Ventricular Rate 58 BPM    Atrial Rate  BPM    LA Interval  ms    QRS Duration 90 ms     ms    QTc 402 ms    P Axis  degrees    R AXIS 0 degrees    T Axis 53 degrees    Interpretation ECG       Atrial fibrillation with slow ventricular response  Nonspecific ST and T wave abnormality  Abnormal ECG  When compared with ECG of 03-OCT-2022 11:44,  No significant change was found     Leonidas Draw    Narrative    The following orders were created for panel order Leonidas Draw.  Procedure                               Abnormality         Status                     ---------                               -----------         ------                     Extra Blue Top Tube[[id number]]                              Final result               Extra Red Top Tube[[id number]]                               Final result                 Please view results for these tests on the individual orders.   Blood gas venous and oxyhgb   Result Value Ref Range    pH Venous 7.28 (L) 7.32 - 7.43    pCO2 Venous 50 40 - 50 mm Hg    pO2 Venous 23 (L) 25 - 47 mm Hg    Bicarbonate Venous 23 21 - 28 mmol/L    FIO2 21     Oxyhemoglobin Venous 36 (L) 70 - 75 %    Base Excess/Deficit -4.0 -7.7 - 1.9 mmol/L   CBC with platelets  differential    Narrative    The following orders were created for panel order CBC with platelets differential.  Procedure                               Abnormality         Status                     ---------                               -----------         ------                     CBC with platelets and d...[[id number]]  Abnormal            Final result                 Please view results for these tests on the individual orders.   Comprehensive metabolic panel   Result Value Ref Range    Sodium 132 (L) 135 - 145 mmol/L    Potassium 5.2 3.4 - 5.3 mmol/L    Carbon Dioxide (CO2) 19 (L) 22 - 29 mmol/L    Anion Gap 12 7 - 15 mmol/L    Urea Nitrogen 39.3 (H) 8.0 - 23.0 mg/dL    Creatinine 1.40 (H) 0.51 - 0.95 mg/dL    GFR Estimate 36 (L) >60 mL/min/1.73m2    Calcium 9.0 8.8 - 10.2 mg/dL    Chloride 101 98 - 107 mmol/L    Glucose 106 (H) 70 - 99 mg/dL    Alkaline Phosphatase 69 40 - 150 U/L    AST 17 0 - 45 U/L    ALT <5 0 - 50 U/L    Protein Total 6.9 6.4 - 8.3 g/dL    Albumin 3.5 3.5 - 5.2 g/dL    Bilirubin Total 0.5 <=1.2 mg/dL   Lactic acid whole blood   Result Value Ref Range    Lactic Acid 3.4 (H) 0.7 - 2.0 mmol/L   Troponin T, High Sensitivity   Result Value Ref Range    Troponin T, High Sensitivity 33 (H) <=14 ng/L   Extra Blue Top Tube   Result Value Ref Range    Hold Specimen JIC    Extra Red Top Tube   Result Value Ref Range    Hold Specimen JIC    CBC with platelets and differential   Result Value Ref Range    WBC Count 10.1 4.0 - 11.0 10e3/uL    RBC Count 3.99 3.80 - 5.20 10e6/uL    Hemoglobin 13.3 11.7 - 15.7 g/dL    Hematocrit 41.4 35.0 - 47.0 %     (H) 78 - 100 fL    MCH 33.3 (H) 26.5 - 33.0 pg    MCHC 32.1 31.5 - 36.5 g/dL    RDW 14.6 10.0 - 15.0 %    Platelet Count 197 150 - 450 10e3/uL    % Neutrophils 78 %    % Lymphocytes 9 %    % Monocytes 11 %    % Eosinophils 1 %    % Basophils 0 %    % Immature Granulocytes 1 %    NRBCs per 100 WBC 0 <1 /100    Absolute Neutrophils 7.8 1.6 - 8.3 10e3/uL     Absolute Lymphocytes 0.9 0.8 - 5.3 10e3/uL    Absolute Monocytes 1.1 0.0 - 1.3 10e3/uL    Absolute Eosinophils 0.1 0.0 - 0.7 10e3/uL    Absolute Basophils 0.0 0.0 - 0.2 10e3/uL    Absolute Immature Granulocytes 0.1 <=0.4 10e3/uL    Absolute NRBCs 0.0 10e3/uL   CT Chest Abdomen Pelvis w/o Contrast   Result Value Ref Range    Radiologist flags       Contrast-enhanced pelvic MR; chest CT follow-up in 3-6 months.    Narrative    EXAM: CT CHEST ABDOMEN PELVIS W/O CONTRAST  LOCATION: [facility]  DATE: 1/8/2024    INDICATION: Dyspnea, abdominal pain, and hypoxia.  COMPARISON: None available.  TECHNIQUE: CT scan of the chest, abdomen, and pelvis was performed without IV contrast. Multiplanar reformats were obtained. Dose reduction techniques were used.   CONTRAST: None.    FINDINGS: Absence of intravenous contrast limits the sensitivity of this examination for detection of infectious/inflammatory change, post traumatic abnormalities, vascular abnormalities, and visceral lesions. Patient was imaged with arm(s) at side,   limiting study quality. Study is also further degraded by motion.    LUNGS AND PLEURA: Trachea and large airways are patent. No focal airspace consolidation. Mild dependent atelectasis and trace interstitial edema.    No suspicious pulmonary nodule.    No pneumothorax.    Trace right pleural effusion.     MEDIASTINUM/AXILLAE: Enlarged right paratracheal lymph node, measuring 11 mm in short axis, series 3 image 51. No appreciable hilar lymphadenopathy, within limitation of the noncontrast technique.     Thoracic esophagus is unremarkable.       No axillary lymphadenopathy.    Chest wall is unremarkable.    Ascending thoracic aortic aneurysm, measuring 4.0 cm. Mild to moderate atherosclerotic calcifications.    Mild-to-moderate cardiomegaly. Dense calcifications of the mitral annulus. No pericardial effusion.    CORONARY ARTERY CALCIFICATION: Moderate.    HEPATOBILIARY: Liver is  normal in attenuation and size.    Cholelithiasis within a mildly distended gallbladder. Further assessment of the gallbladder is limited by motion.    No intrahepatic or intrahepatic biliary ductal dilatation.    PANCREAS: Diffuse fatty infiltration.    SPLEEN: Normal attenuation. Normal size.    ADRENAL GLANDS: Normal.    KIDNEYS: No hydronephrosis.    No nephroureterolithiasis.    Urinary bladder is unremarkable.    PELVIC ORGANS: No suspicious abnormality. Calcified uterine fibroid disease.    BOWEL: Moderate rectal stool burden, with multiple peripheral wall thickening and presacral fat stranding, suggesting an underlying stercoral colitis. Milder proximal colonic stool burden. Colonic diverticulosis. Appendix not visualized.    No intraperitoneal free fluid or free air.    LYMPH NODES: No suspicious abdominal or pelvic lymphadenopathy.    VASCULATURE: No abdominal aortic aneurysm. Moderate atherosclerotic calcifications.    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 Masslike lesions of the right piriformis and right gluteus laurie muscle bellies, measuring up to 4.8 and 3.6 cm, respectively. Central   hyperdensity associated with the right gluteus stability lesion suggests this may reflect a hematoma, though remains technically indeterminate.    OTHER: No additionally significant abnormalities.      Impression    IMPRESSION:  1.  CT findings suggestive of early fecal impaction and stercoral colitis.  2.  Cholelithiasis and mild gallbladder distention, incompletely evaluated due to study limitations. If there is high suspicion of acute cholecystitis, further evaluation with gallbladder sonography is recommended.  3.  Trace interstitial edema and trace right pleural effusion.  4.  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  5.  Masslike lesions of the right piriformis and gluteus laurie muscle bellies, possibly hematomas, though  neoplastic lesions are not excluded. Pelvic MR imaging with and without intravenous contrast is recommended for further characterization, when   clinically feasible.  6.  Mild right paratracheal lymphadenopathy. Chest CT follow-up recommended in 3-6 months.  7.  Ascending thoracic aortic aneurysm, measuring 4.0 cm.  8.  Cardiomegaly.      [Recommend Follow Up: Contrast-enhanced pelvic MR; chest CT follow-up in 3-6 months.]    This report will be copied to the [facility] to ensure a provider acknowledges the finding.      Head CT w/o contrast    Narrative    EXAM: CT HEAD W/O CONTRAST  LOCATION: [facility]  DATE: 1/8/2024    INDICATION: Altered mental status.  COMPARISON: None.  TECHNIQUE: Routine CT Head without IV contrast. Multiplanar reformats. Dose reduction techniques were used.    FINDINGS:  INTRACRANIAL CONTENTS: No intracranial hemorrhage, extraaxial collection, or mass effect.  No CT evidence of acute infarct. Mild to moderate presumed chronic small vessel ischemic changes. Mild to moderate generalized volume loss. No hydrocephalus.     VISUALIZED ORBITS/SINUSES/MASTOIDS: No intraorbital abnormality. Complete opacification left maxillary sinus. No middle ear or mastoid effusion.    BONES/SOFT TISSUES: No acute abnormality.      Impression    IMPRESSION:  1.  No CT evidence for acute intracranial process.  2.  Brain atrophy and presumed chronic microvascular ischemic changes as above.   Troponin T, High Sensitivity (now)   Result Value Ref Range    Troponin T, High Sensitivity 29 (H) <=14 ng/L   UA with Microscopic reflex to Culture    Specimen: Urine, Catheter   Result Value Ref Range    Color Urine Yellow Colorless, Straw, Light Yellow, Yellow    Appearance Urine Slightly Cloudy (A) Clear    Glucose Urine Negative Negative mg/dL    Bilirubin Urine Negative Negative    Ketones Urine Negative Negative mg/dL    Specific Gravity Urine 1.018 1.003 - 1.035    Blood Urine Small  (A) Negative    pH Urine 5.5 5.0 - 7.0    Protein Albumin Urine 10 (A) Negative mg/dL    Urobilinogen Urine Normal Normal, 2.0 mg/dL    Nitrite Urine Negative Negative    Leukocyte Esterase Urine Negative Negative    Bacteria Urine Many (A) None Seen /HPF    Mucus Urine Present (A) None Seen /LPF    RBC Urine 1 <=2 /HPF    WBC Urine 3 <=5 /HPF    Hyaline Casts Urine 1 <=2 /LPF    Narrative    Urine Culture not indicated   Lactic acid whole blood   Result Value Ref Range    Lactic Acid 1.0 0.7 - 2.0 mmol/L   Troponin T, High Sensitivity   Result Value Ref Range    Troponin T, High Sensitivity 29 (H) <=14 ng/L   Troponin T, High Sensitivity   Result Value Ref Range    Troponin T, High Sensitivity 33 (H) <=14 ng/L   Basic metabolic panel   Result Value Ref Range    Sodium 135 135 - 145 mmol/L    Potassium 3.8 3.4 - 5.3 mmol/L    Chloride 104 98 - 107 mmol/L    Carbon Dioxide (CO2) 21 (L) 22 - 29 mmol/L    Anion Gap 10 7 - 15 mmol/L    Urea Nitrogen 26.0 (H) 8.0 - 23.0 mg/dL    Creatinine 0.84 0.51 - 0.95 mg/dL    GFR Estimate 66 >60 mL/min/1.73m2    Calcium 8.1 (L) 8.8 - 10.2 mg/dL    Glucose 98 70 - 99 mg/dL       Medical Decision Making     92 MINUTES SPENT BY ME on the date of service doing chart review, history, exam, documentation & further activities per the note.

## 2024-01-09 NOTE — PHARMACY-ADMISSION MEDICATION HISTORY
Pharmacist Admission Medication History    Admission medication history is complete. The information provided in this note is only as accurate as the sources available at the time of the update.    Information Source(s): Facility (U/NH/) medication list/MAR via med list from the moments in [place]       Changes made to PTA medication list:  Added: all medications  Deleted: None  Changed: None    Allergies reviewed with patient and updates made in EHR: unable to assess    Medication History Completed By: Hesham Gomez [facility] 1/9/2024 11:12 AM    Prior to Admission medications    Medication Sig Last Dose Taking? Auth Provider Long Term End Date   acetaminophen (TYLENOL) 500 MG tablet Take 1,000 mg by mouth 3 times daily  Yes Unknown, Entered By History     allopurinol (ZYLOPRIM) 100 MG tablet Take 100 mg by mouth at bedtime  Yes Unknown, Entered By History     apixaban ANTICOAGULANT (ELIQUIS) 5 MG tablet Take 5 mg by mouth 2 times daily  Yes Unknown, Entered By History     Artificial Saliva (BIOTENE DRY MOUTH) LOZG Take 1 lozenge by mouth 4 times daily as needed (dry mouth)  Yes Unknown, Entered By History     benzocaine (ORAJEL MAXIMUM STRENGTH) 20 % GEL gel Take by mouth 4 times daily as needed for mouth sores (apply pea-size amount to painful teeth/gums)  Yes Unknown, Entered By History     calcium carbonate (TUMS) 500 MG chewable tablet Take 1 chew tab by mouth every 6 hours as needed for heartburn  Yes Unknown, Entered By History     carbidopa-levodopa (SINEMET CR)  MG CR tablet Take 1 tablet by mouth at bedtime  Yes Unknown, Entered By History No    carbidopa-levodopa (SINEMET)  MG tablet Take 1 tablet by mouth 3 times daily  Yes Unknown, Entered By History No    carbidopa-levodopa (SINEMET)  MG tablet Take 0.5 tablets by mouth daily as needed (Parkinson's disease)  Yes Unknown, Entered By History No    carbidopa-levodopa (SINEMET)  MG tablet Take 0.5 tablets by mouth at bedtime   Yes Unknown, Entered By History No    cetirizine (ZYRTEC) 5 MG tablet Take 5 mg by mouth daily  Yes Unknown, Entered By History     Cranberry 250 MG CAPS Take 500 mg by mouth daily  Yes Unknown, Entered By History     diclofenac (VOLTAREN) 1 % topical gel Apply 4 g topically 3 times daily  Yes Unknown, Entered By History     doxepin (SILENOR) 3 MG tablet Take 3 mg by mouth at bedtime  Yes Unknown, Entered By History     fluticasone (FLONASE) 50 MCG/ACT nasal spray Spray 1 spray into both nostrils daily  Yes Unknown, Entered By History     furosemide (LASIX) 40 MG tablet Take 40 mg by mouth daily  Yes Unknown, Entered By History No    gabapentin (NEURONTIN) 100 MG capsule Take 100 mg by mouth 3 times daily  Yes Unknown, Entered By History Yes    hydrOXYzine HCl (ATARAX) 25 MG tablet Take 25 mg by mouth every 8 hours as needed for anxiety  Yes Unknown, Entered By History     Lactobacillus (ACIDOPHILUS PO) Take 1 capsule by mouth daily 250 million  Yes Unknown, Entered By History     lidocaine (LMX4) 4 % external cream Apply topically 3 times daily Apply pea sized amount to back three times daily for low back pain  Yes Unknown, Entered By History     loperamide (IMODIUM) 2 MG capsule Take 2 mg by mouth 2 times daily as needed for diarrhea  Yes Unknown, Entered By History     losartan (COZAAR) 25 MG tablet Take 25 mg by mouth every evening  Yes Unknown, Entered By History No    losartan (COZAAR) 50 MG tablet Take 50 mg by mouth every morning  Yes Unknown, Entered By History No    metoprolol succinate ER (TOPROL XL) 50 MG 24 hr tablet Take 50 mg by mouth 2 times daily  Yes Unknown, Entered By History No    mirabegron (MYRBETRIQ) 25 MG 24 hr tablet Take 25 mg by mouth at bedtime  Yes Unknown, Entered By History     nystatin (MYCOSTATIN) 550792 UNIT/GM external powder Apply topically 2 times daily as needed (under  right breast)  Yes Unknown, Entered By History     polyethylene glycol (MIRALAX) 17 g packet Take 8.5 g by  mouth every other day  Yes Unknown, Entered By History     polyethylene glycol (MIRALAX) 17 g packet Take 8.5 g by mouth daily as needed for constipation  Yes Unknown, Entered By History     polyethylene glycol-propylene glycol (SYSTANE ULTRA) 0.4-0.3 % SOLN ophthalmic solution Place 1 drop into both eyes every 4 hours as needed for dry eyes  Yes Unknown, Entered By History     potassium chloride ER (MICRO-K) 10 MEQ CR capsule Take 20 mEq by mouth 2 times daily  Yes Unknown, Entered By History     pramipexole (MIRAPEX) 0.125 MG tablet Take 0.125 mg by mouth nightly as needed (RLS)  Yes Unknown, Entered By History No    pramipexole (MIRAPEX) 0.125 MG tablet Take 0.125 mg by mouth every evening  Yes Unknown, Entered By History No    pramox-pe-glycerin-petrolatum (PREPARATION H) 1-0.25-14.4-15 % CREA cream Place rectally 4 times daily as needed for hemorrhoids  Yes Unknown, Entered By History     sertraline (ZOLOFT) 100 MG tablet Take 100 mg by mouth daily  Yes Unknown, Entered By History No    simethicone (MYLICON) 125 MG chewable tablet Take 125 mg by mouth 4 times daily as needed for intestinal gas  Yes Unknown, Entered By History     traMADol (ULTRAM) 50 MG tablet Take 50 mg by mouth every morning  Yes Unknown, Entered By History     traMADol (ULTRAM) 50 MG tablet Take 25 mg by mouth 2 times daily as needed for pain  Yes Unknown, Entered By History     traMADol (ULTRAM) 50 MG tablet Take 25 mg by mouth 2 times daily  Yes Unknown, Entered By History     traZODone (DESYREL) 50 MG tablet Take 50 mg by mouth at bedtime  Yes Unknown, Entered By History     vitamin D3 (CHOLECALCIFEROL) 50 mcg (2000 units) tablet Take 50 mcg by mouth daily  Yes Unknown, Entered By History

## 2024-01-09 NOTE — H&P
[facility]  Hospitalist H&P    Name: Leola Bui      MRN: [id number]  YOB: 1935    Age: 88 year old  Date of admission: 1/8/2024  Primary care provider: Kristopher Barahona            Assessment and Plan:     Leola Bui is a 88 year old female with a history of hypertension, chronic diastolic CHF, stage III CKD, chronic atrial fibrillation, and advanced Parkinson's with dementia residing in a memory care unit who presents with failure to thrive.    Problem list:  Generalized weakness and failure to thrive: Patient's daughter brought her to the hospital today due to overall generalized weakness and poor oral intake over the past several weeks but also the patient became limp when facility staff had put her in a left.  She does appear mildly volume depleted and has some mild acute kidney injury but overall no significant acute processes been identified.  Overall I suspect this is progressive decline due to age and worsening Parkinson's disease with associated dementia.  She already resides at a memory care unit and is at baseline in a wheelchair.  I doubt she has significant physical rehabilitation potential but will request a PT evaluation.  Will also involve social work for disposition purposes.  Palliative care consultation could be considered as well.  Will also request SLP evaluation given her limited oral intake and the patient's concern about potential swallowing issues.  Incidental masslike lesions of the right piriformis and gluteus laurie and peritracheal lymphadenopathy: Could be hematomas but neoplastic lesions not excluded.  Pelvic MRI recommended for further characterization.  Discussed with daughter who does not want further investigation due to her mother's elderly age, dementia, and fairly limited care plan.  Radiology also recommending follow-up CT regarding the lymphadenopathy in 3 to 6 months.  Daughter also not interested in surveillance or workup of this  finding.  Constipation and fecal impaction: Patient received an enema with good results in the ED.  Continuing aggressive bowel regimen.  Acute kidney injury on stage III CKD: Current creatinine is 1.4.  Creatinine June of this year was 0.6.  She does appear hypovolemic on examination so we will continue normal saline at 75 cc/h.  Will need to monitor volume status closely given her history of diastolic CHF.  Urinary tract infection: She is currently being treated with oral antibiotics for UTI.  Urinalysis here is fairly unremarkable.  The daughter is unclear what the antibiotic is called but we will await formal medication reconciliation and continue the entire course of the antibiotic.  Hypovolemic hyponatremia: Sodium is slightly low at 132.  We will perform IV fluid administration.  Lactic acidosis: As above, likely related to volume depletion and hypovolemia.  She does not appear septic.  She is afebrile without leukocytosis and no infectious etiology has been identified.  Will continue fluid resuscitation and recheck a lactic acid level.  Troponin elevation: Likely demand ischemia.  Troponin level is downtrending already.  She has not had any chest pain.  EKG shows rate controlled atrial fibrillation with nonspecific ST and T wave abnormalities.  Will place the patient on cardiac monitoring but otherwise not pursue further workup.  Atrial fibrillation: Currently rate controlled.  Continue metoprolol and Eliquis for stroke prophylaxis.  Parkinson's disease with advanced dementia: She resides in a memory care unit.  At baseline she is in a wheelchair.  Will continue her Sinemet.  Hypertension: Blood pressure is somewhat elevated.  Will continue her prior to admission metoprolol but hold the losartan with acute kidney injury.    Clinically Significant Risk Factors Present on Admission                 # Drug Induced Coagulation Defect: home medication list includes an anticoagulant medication        #  Hypertension: Home medication list includes antihypertensive(s)                   Code status: DNR/DNI, confirmed with the patient's daughter at bedside.  Admit to observation status.  Prophylaxis: Eliquis.  Disposition: Likely back to memory care unit in 1 to 2 days.    80 MINUTES SPENT BY ME on the date of service doing chart review, history, exam, documentation & further activities per the note.          Chief Complaint:     Generalized weakness and failure to thrive.         History of Present Illness:   Leola Bui is a 88 year old female who presents with generalized weakness and failure to thrive.  History was obtained from my discussion with the patient's daughter at the bedside.  I also discussed the case with the ED provider.  The electronic medical record was also reviewed.    The patient has advanced Parkinson's disease and resides in a memory care unit.  At baseline she is limited to a wheelchair.  Her  was actually in this memory care unit for short period of time but  on hospice in October of this year.  For the past several days and even a week or 2 the patient has been becoming generally more weak.  She was diagnosed with a urinary tract infection and has been placed on an oral antibiotic.  She has not had any fevers.  She has been eating and drinking quite a bit less than her baseline.  Earlier this evening the patient's daughter was called by facility staff as the patient became limp while she was in a left.  She was overall brought to the hospital for evaluation.            Past Medical History:     Past Medical History:   Diagnosis Date    Acute diastolic congestive heart failure (H)     Anxiety     Breast cancer (H)     CKD (chronic kidney disease) stage 3, GFR 30-59 ml/min (H)     Essential hypertension     Gout     Osteoporosis     Parkinson disease (H)     Parkinsons disease (H)              Past Surgical History:     Past Surgical History:   Procedure Laterality Date     MASTECTOMY      PHACOEMULSIFICATION CLEAR CORNEA WITH STANDARD INTRAOCULAR LENS IMPLANT Left 9/19/2022    Procedure: PHACOEMULSIFICATION, CATARACT, WITH INTRAOCULAR LENS IMPLANT LEFT;  Surgeon: Contreras Feldman MD;  Location: [facility] OR    PHACOEMULSIFICATION CLEAR CORNEA WITH STANDARD INTRAOCULAR LENS IMPLANT Right 12/26/2022    Procedure: PHACOEMULSIFICATION, CATARACT, WITH INTRAOCULAR LENS IMPLANT RIGHT;  Surgeon: Contreras Feldman MD;  Location: [facility] OR             Social History:     Social History     Tobacco Use    Smoking status: Never    Smokeless tobacco: Never   Substance Use Topics    Alcohol use: Not Currently             Family History:   The family history was fully reviewed and non-contributory in this case.         Allergies:     Allergies   Allergen Reactions    Erythromycin Hives and Rash    Penicillins Hives, Itching and Rash    Sulfa Antibiotics Hives and Rash    Amlodipine      Other reaction(s): *Unknown    Carvedilol      Other reaction(s): Syncope    Clonidine      Other reaction(s): *Unknown    Diltiazem      Other reaction(s): *Unknown    Flagyl [Metronidazole]     Hydrochlorothiazide      Other reaction(s): *Unknown    Lisinopril              Medications:     Prior to Admission medications    Medication Sig Last Dose Taking? Auth Provider Long Term End Date   acetaminophen (TYLENOL) 325 MG tablet Take 650 mg by mouth   Reported, Patient     allopurinol (ZYLOPRIM) 100 MG tablet Take 100 mg by mouth   Reported, Patient     artificial saliva (BIOTENE MT) SOLN solution Apply 2 spray 3 times daily prior to meals   Reported, Patient     carbidopa-levodopa (SINEMET CR)  MG CR tablet Take 1 tablet by mouth At Bedtime   Reported, Patient Yes    cholecalciferol 25 MCG (1000 UT) TABS Take 2,000 Units by mouth   Reported, Patient     cholecalciferol 50 MCG (2000 UT) CAPS Take 2,000 Units by mouth   Reported, Patient     diclofenac (VOLTAREN) 1 % topical gel    Reported, Patient      ELIQUIS ANTICOAGULANT 5 MG tablet    Reported, Patient     fluticasone (FLONASE) 50 MCG/ACT nasal spray    Reported, Patient     furosemide (LASIX) 40 MG tablet    Reported, Patient Yes    ketorolac (ACULAR) 0.5 % ophthalmic solution Place 1 drop into the right eye 4 times daily   Corrie Lemus MD     ketorolac (ACULAR) 0.5 % ophthalmic solution Place 1 drop Into the left eye 4 times daily   Corrie Lemus MD     Lactobacillus (PROBIOTIC ACIDOPHILUS) CAPS Take 1 capsule by mouth   Reported, Patient     Lidocaine (LIDOCARE) 4 % Patch Apply to intact skin for upto 12 hrs within 24-hr period.   Reported, Patient     losartan (COZAAR) 50 MG tablet Take 50 mg by mouth   Reported, Patient Yes    metoprolol succinate ER (TOPROL XL) 50 MG 24 hr tablet    Reported, Patient Yes    moxifloxacin (VIGAMOX) 0.5 % ophthalmic solution Place 1 drop Into the left eye 3 times daily   Corrie Lemus MD     MYRBETRIQ 25 MG 24 hr tablet    Reported, Patient     NYSTOP 338805 UNIT/GM powder    Reported, Patient     ofloxacin (OCUFLOX) 0.3 % ophthalmic solution Place 1 drop into the right eye 4 times daily   Corrie Lemsu MD     polyethyl glycol-propyl glycol 0.4-0.3 % Apply 1 drop to eye   Reported, Patient     potassium chloride ER (K-TAB/KLOR-CON) 10 MEQ CR tablet Take 10 mEq by mouth   Reported, Patient     pramipexole (MIRAPEX) 0.125 MG tablet Take 0.125 mg p.o. q PM and q HS as needed RLS   Reported, Patient Yes    prednisoLONE acetate (PRED FORTE) 1 % ophthalmic suspension Place 1 drop into the right eye 4 times daily   Corrie Lemus MD     prednisoLONE acetate (PRED FORTE) 1 % ophthalmic suspension Place 1 drop Into the left eye 4 times daily   Corrie Lemus MD     sertraline (ZOLOFT) 25 MG tablet    Reported, Patient Yes    sertraline (ZOLOFT) 50 MG tablet    Reported, Patient Yes    simethicone (MYLICON) 80 MG chewable tablet Take 80 mg by mouth   Reported, Patient               Review of Systems:     A  "Comprehensive greater than 10 system review of systems was carried out.  Pertinent positives and negatives are noted above.  Otherwise negative for contributory information.           Physical Exam:   Blood pressure (!) 143/104, pulse 77, temperature 96.8  F (36  C), temperature source Temporal, resp. rate 24, SpO2 93%.  Wt Readings from Last 1 Encounters:   12/26/22 71.3 kg (157 lb 3 oz)     Exam:  GENERAL: No apparent distress. Awake, alert, but not oriented.  HEENT: Normocephalic, atraumatic. Extraocular movements intact.  CARDIOVASCULAR: Regular rate and rhythm without murmurs or rubs. No S3.  PULMONARY: Clear to auscultation bilaterally.  ABDOMINAL: Soft, non-tender, non-distended. Bowel sounds normoactive.   EXTREMITIES: No cyanosis or clubbing. No appreciable edema.  NEUROLOGICAL: CN 2-12 grossly intact, baseline neurological deficits. Tremulous.  DERMATOLOGICAL: No rash, ulcer, bruising, nor jaundice.          Data:   EKG:  Personally reviewed.     Laboratory:  Recent Labs   Lab 01/08/24 2019   WBC 10.1   HGB 13.3   HCT 41.4   *        Recent Labs   Lab 01/08/24 2019   *   POTASSIUM 5.2   CHLORIDE 101   CO2 19*   ANIONGAP 12   *   BUN 39.3*   CR 1.40*   GFRESTIMATED 36*   DIONE 9.0     Recent Labs   Lab 01/09/24  0257   COLOR Yellow   APPEARANCE Slightly Cloudy*   URINEGLC Negative   URINEBILI Negative   URINEKETONE Negative   SG 1.018   UBLD Small*   URINEPH 5.5   PROTEIN 10*   NITRITE Negative   LEUKEST Negative   RBCU 1   WBCU 3     No results for input(s): \"CULT\" in the last 168 hours.    Imaging:  Recent Results (from the past 24 hour(s))   CT Chest Abdomen Pelvis w/o Contrast   Result Value    Radiologist flags      Contrast-enhanced pelvic MR; chest CT follow-up in 3-6 months.    Narrative    EXAM: CT CHEST ABDOMEN PELVIS W/O CONTRAST  LOCATION: [facility]  DATE: 1/8/2024    INDICATION: Dyspnea, abdominal pain, and hypoxia.  COMPARISON: None " available.  TECHNIQUE: CT scan of the chest, abdomen, and pelvis was performed without IV contrast. Multiplanar reformats were obtained. Dose reduction techniques were used.   CONTRAST: None.    FINDINGS: Absence of intravenous contrast limits the sensitivity of this examination for detection of infectious/inflammatory change, post traumatic abnormalities, vascular abnormalities, and visceral lesions. Patient was imaged with arm(s) at side,   limiting study quality. Study is also further degraded by motion.    LUNGS AND PLEURA: Trachea and large airways are patent. No focal airspace consolidation. Mild dependent atelectasis and trace interstitial edema.    No suspicious pulmonary nodule.    No pneumothorax.    Trace right pleural effusion.     MEDIASTINUM/AXILLAE: Enlarged right paratracheal lymph node, measuring 11 mm in short axis, series 3 image 51. No appreciable hilar lymphadenopathy, within limitation of the noncontrast technique.     Thoracic esophagus is unremarkable.       No axillary lymphadenopathy.    Chest wall is unremarkable.    Ascending thoracic aortic aneurysm, measuring 4.0 cm. Mild to moderate atherosclerotic calcifications.    Mild-to-moderate cardiomegaly. Dense calcifications of the mitral annulus. No pericardial effusion.    CORONARY ARTERY CALCIFICATION: Moderate.    HEPATOBILIARY: Liver is normal in attenuation and size.    Cholelithiasis within a mildly distended gallbladder. Further assessment of the gallbladder is limited by motion.    No intrahepatic or intrahepatic biliary ductal dilatation.    PANCREAS: Diffuse fatty infiltration.    SPLEEN: Normal attenuation. Normal size.    ADRENAL GLANDS: Normal.    KIDNEYS: No hydronephrosis.    No nephroureterolithiasis.    Urinary bladder is unremarkable.    PELVIC ORGANS: No suspicious abnormality. Calcified uterine fibroid disease.    BOWEL: Moderate rectal stool burden, with multiple peripheral wall thickening and presacral fat stranding,  suggesting an underlying stercoral colitis. Milder proximal colonic stool burden. Colonic diverticulosis. Appendix not visualized.    No intraperitoneal free fluid or free air.    LYMPH NODES: No suspicious abdominal or pelvic lymphadenopathy.    VASCULATURE: No abdominal aortic aneurysm. Moderate atherosclerotic calcifications.    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 Masslike lesions of the right piriformis and right gluteus laurie muscle bellies, measuring up to 4.8 and 3.6 cm, respectively. Central   hyperdensity associated with the right gluteus stability lesion suggests this may reflect a hematoma, though remains technically indeterminate.    OTHER: No additionally significant abnormalities.      Impression    IMPRESSION:  1.  CT findings suggestive of early fecal impaction and stercoral colitis.  2.  Cholelithiasis and mild gallbladder distention, incompletely evaluated due to study limitations. If there is high suspicion of acute cholecystitis, further evaluation with gallbladder sonography is recommended.  3.  Trace interstitial edema and trace right pleural effusion.  4.  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  5.  Masslike lesions of the right piriformis and gluteus laurie muscle bellies, possibly hematomas, though neoplastic lesions are not excluded. Pelvic MR imaging with and without intravenous contrast is recommended for further characterization, when   clinically feasible.  6.  Mild right paratracheal lymphadenopathy. Chest CT follow-up recommended in 3-6 months.  7.  Ascending thoracic aortic aneurysm, measuring 4.0 cm.  8.  Cardiomegaly.      [Recommend Follow Up: Contrast-enhanced pelvic MR; chest CT follow-up in 3-6 months.]    This report will be copied to the [facility] to ensure a provider acknowledges the finding.      Head CT w/o contrast    Narrative    EXAM: CT HEAD W/O  CONTRAST  LOCATION: [facility]  DATE: 1/8/2024    INDICATION: Altered mental status.  COMPARISON: None.  TECHNIQUE: Routine CT Head without IV contrast. Multiplanar reformats. Dose reduction techniques were used.    FINDINGS:  INTRACRANIAL CONTENTS: No intracranial hemorrhage, extraaxial collection, or mass effect.  No CT evidence of acute infarct. Mild to moderate presumed chronic small vessel ischemic changes. Mild to moderate generalized volume loss. No hydrocephalus.     VISUALIZED ORBITS/SINUSES/MASTOIDS: No intraorbital abnormality. Complete opacification left maxillary sinus. No middle ear or mastoid effusion.    BONES/SOFT TISSUES: No acute abnormality.      Impression    IMPRESSION:  1.  No CT evidence for acute intracranial process.  2.  Brain atrophy and presumed chronic microvascular ischemic changes as above.       Estuardo Hdez DO MPH  [facility] Hospitalist  [address] [place] [place] [address]  01/09/2024

## 2024-01-09 NOTE — CONSULTS
Care Management Initial Consult    General Information  Assessment completed with: Children, daughter Bridget  Type of CM/SW Visit: Initial Assessment    Primary Care Provider verified and updated as needed: Yes   Readmission within the last 30 days: no previous admission in last 30 days      Reason for Consult: discharge planning  Advance Care Planning:            Communication Assessment  Patient's communication style: spoken language (English or Bilingual)             Cognitive  Cognitive/Neuro/Behavioral: .WDL except, all  Level of Consciousness: confused  Arousal Level: arouses to voice  Orientation: disoriented x 4  Mood/Behavior: anxious  Best Language: 0 - No aphasia  Speech: garbled    Living Environment:   People in home: facility resident     Current living Arrangements: assisted living  Name of Facility: [facility]   Able to return to prior arrangements: yes       Family/Social Support:  Care provided by: self, other (see comments)  Provides care for: no one, unable/limited ability to care for self  Marital Status:   Children, Facility resident(s)/Staff          Description of Support System: Involved, Supportive    Support Assessment: Adequate family and caregiver support    Current Resources:   Patient receiving home care services:       Community Resources:    Equipment currently used at home:    Supplies currently used at home:      Employment/Financial:  Employment Status:          Financial Concerns:             Does the patient's insurance plan have a 3 day qualifying hospital stay waiver?  No    Lifestyle & Psychosocial Needs:  Social Determinants of Health     Food Insecurity: Not on file   Depression: Not on file   Housing Stability: Not on file   Tobacco Use: Low Risk  (2/14/2023)    Patient History     Smoking Tobacco Use: Never     Smokeless Tobacco Use: Never     Passive Exposure: Not on file   Financial Resource Strain: Not on file   Alcohol Use: Not on file   Transportation Needs:  Not on file   Physical Activity: Not on file   Interpersonal Safety: Not on file   Stress: Not on file   Social Connections: Not on file       Functional Status:  Prior to admission patient needed assistance:   Dependent ADLs:: Wheelchair-with assist, Transfers, Positioning, Incontinence, Grooming, Eating, Dressing, Bathing  Dependent IADLs:: Cleaning, Cooking, Laundry, Shopping, Meal Preparation, Medication Management, Money Management, Transportation  Assesssment of Functional Status: Not at baseline with ADL Functioning    Mental Health Status:          Chemical Dependency Status:                Values/Beliefs:  Spiritual, Cultural Beliefs, Rastafarian Practices, Values that affect care:                 Additional Information:    SW met with pt/daughter Bridget at the bedside to discuss discharge planning. Pt daughter reports that pt lives in [facility] in [place] and is total cares at baseline. Pt is WC bound and uses a lift at baseline. Pt daughter explains that pt will require [facility] stretcher at discharge and she is aware of the cost. Pt daughter shares that they are interested in a palliative care consult to explore if they would be ready for hospice for pt. They used [facility] for pt spouse and had a positive experience. Pt daughter is still unsure if this is the route that they will take. Pt daughter is accepting of SW reaching out to the [facility].     SW left  for [facility] (P: [phone] F: [phone]).     Addendum    VIMAL spoke with RN at [facility] (P: [phone] F: [phone]) who reports that pt is WC bound at baseline and transfers with an EZ stand. Pt receives assist with all cares at baseline. They report that they accept pts back anytime before 1100 or after 1230 including weekends. They use [facility] and report if medications are filled and sent with they need to be bubble packed. VIMAL following.     Yenny Maxwell, ARUNA, [facility]  Inpatient Care Coordination  Emergency Room  /Float  [phone]    Yenny Maxwell., LGSW

## 2024-01-09 NOTE — ED NOTES
[facility]  ED Nurse Handoff Report    ED Chief complaint: Altered Mental Status  . ED Diagnosis:   Final diagnoses:   Generalized weakness   Dehydration   Acute renal insufficiency   Soft tissue mass   Fecal impaction (H)   Calculus of gallbladder without cholecystitis without obstruction   History of atrial fibrillation   History of dementia       Allergies:   Allergies   Allergen Reactions    Erythromycin Hives and Rash    Penicillins Hives, Itching and Rash    Sulfa Antibiotics Hives and Rash    Amlodipine      Other reaction(s): *Unknown    Carvedilol      Other reaction(s): Syncope    Clonidine      Other reaction(s): *Unknown    Diltiazem      Other reaction(s): *Unknown    Flagyl [Metronidazole]     Hydrochlorothiazide      Other reaction(s): *Unknown    Lisinopril        Code Status: DNR / DNI    Activity level - Baseline/Home:  lift.  Activity Level - Current:   lift.   Lift room needed: Yes.   Bariatric: No   Needed: No   Isolation: No.   Infection: Not Applicable.     Respiratory status: Room air    Vital Signs (within 30 minutes):   Vitals:    01/08/24 1904 01/09/24 0147 01/09/24 0216 01/09/24 0300   BP: (!) 131/95 (!) 147/117 (!) 143/104    Pulse:   90 77   Resp:       Temp:       TempSrc:       SpO2:  95% 96% 96%       Cardiac Rhythm:  ,   Cardiac  Cardiac Rhythm: Normal sinus rhythm  Pain level:    Patient confused: Yes.   Patient Falls Risk: nonskid shoes/slippers when out of bed, patient and family education, and assistive device/personal items within reach.   Elimination Status: Has voided     Patient Report - Initial Complaint: altered mental status.   Focused Assessment:     Neuro CognitiveCognitive/Neuro/Behavioral WDL: .WDL except; allLevel of Consciousness: confusedArousal Level: arouses to voiceOrientation: disoriented to; place; time; situation (Patient confused at baseline and remains at baseline confusion. Patient knows own name.)Speech: incoherent (RN  able to understand every few words.)Mood/Behavior: anxious  [place] Coma ScaleBest Eye Response: 4-->(E4) spontaneousBest Motor Response: 6-->(M6) obeys commandsBest Verbal Response: 4-->(V4) confusedGlasgow Coma Scale Score: 14       Abnormal Results:   Labs Ordered and Resulted from Time of ED Arrival to Time of ED Departure   BLOOD GAS VENOUS WITH OXYHEMOGLOBIN - Abnormal       Result Value    pH Venous 7.28 (*)     pCO2 Venous 50      pO2 Venous 23 (*)     Bicarbonate Venous 23      FIO2 21      Oxyhemoglobin Venous 36 (*)     Base Excess/Deficit -4.0     COMPREHENSIVE METABOLIC PANEL - Abnormal    Sodium 132 (*)     Potassium 5.2      Carbon Dioxide (CO2) 19 (*)     Anion Gap 12      Urea Nitrogen 39.3 (*)     Creatinine 1.40 (*)     GFR Estimate 36 (*)     Calcium 9.0      Chloride 101      Glucose 106 (*)     Alkaline Phosphatase 69      AST 17      ALT <5      Protein Total 6.9      Albumin 3.5      Bilirubin Total 0.5     LACTIC ACID WHOLE BLOOD - Abnormal    Lactic Acid 3.4 (*)    TROPONIN T, HIGH SENSITIVITY - Abnormal    Troponin T, High Sensitivity 33 (*)    ROUTINE UA WITH MICROSCOPIC REFLEX TO CULTURE - Abnormal    Color Urine Yellow      Appearance Urine Slightly Cloudy (*)     Glucose Urine Negative      Bilirubin Urine Negative      Ketones Urine Negative      Specific Gravity Urine 1.018      Blood Urine Small (*)     pH Urine 5.5      Protein Albumin Urine 10 (*)     Urobilinogen Urine Normal      Nitrite Urine Negative      Leukocyte Esterase Urine Negative      Bacteria Urine Many (*)     Mucus Urine Present (*)     RBC Urine 1      WBC Urine 3      Hyaline Casts Urine 1     CBC WITH PLATELETS AND DIFFERENTIAL - Abnormal    WBC Count 10.1      RBC Count 3.99      Hemoglobin 13.3      Hematocrit 41.4       (*)     MCH 33.3 (*)     MCHC 32.1      RDW 14.6      Platelet Count 197      % Neutrophils 78      % Lymphocytes 9      % Monocytes 11      % Eosinophils 1      % Basophils 0      %  Immature Granulocytes 1      NRBCs per 100 WBC 0      Absolute Neutrophils 7.8      Absolute Lymphocytes 0.9      Absolute Monocytes 1.1      Absolute Eosinophils 0.1      Absolute Basophils 0.0      Absolute Immature Granulocytes 0.1      Absolute NRBCs 0.0     TROPONIN T, HIGH SENSITIVITY - Abnormal    Troponin T, High Sensitivity 29 (*)         Head CT w/o contrast   Final Result   IMPRESSION:   1.  No CT evidence for acute intracranial process.   2.  Brain atrophy and presumed chronic microvascular ischemic changes as above.      CT Chest Abdomen Pelvis w/o Contrast   Final Result   IMPRESSION:   1.  CT findings suggestive of early fecal impaction and stercoral colitis.   2.  Cholelithiasis and mild gallbladder distention, incompletely evaluated due to study limitations. If there is high suspicion of acute cholecystitis, further evaluation with gallbladder sonography is recommended.   3.  Trace interstitial edema and trace right pleural effusion.   4.  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   5.  Masslike lesions of the right piriformis and gluteus laurie muscle bellies, possibly hematomas, though neoplastic lesions are not excluded. Pelvic MR imaging with and without intravenous contrast is recommended for further characterization, when    clinically feasible.   6.  Mild right paratracheal lymphadenopathy. Chest CT follow-up recommended in 3-6 months.   7.  Ascending thoracic aortic aneurysm, measuring 4.0 cm.   8.  Cardiomegaly.         [Recommend Follow Up: Contrast-enhanced pelvic MR; chest CT follow-up in 3-6 months.]      This report will be copied to the [facility] to ensure a provider acknowledges the finding.              Treatments provided: see MAR  Family Comments: daughter at bedside  OBS brochure/video discussed/provided to patient:  Yes  ED Medications:   Medications   lactated ringers BOLUS 1,000 mL (0 mLs Intravenous Stopped 1/8/24 9428)   lactated ringers BOLUS 1,000  mL (1,000 mLs Intravenous $New Bag 1/8/24 2206)   Enema Compound (docusate/mineral oil/NaPhos) NO MAG CIT PREMIX (226 mLs Rectal $Given 1/9/24 0053)   hydrOXYzine HCl (ATARAX) tablet 25 mg (25 mg Oral $Given 1/9/24 0040)       Drips infusing:  No  For the majority of the shift this patient was Green.   Interventions performed were none needed.    Sepsis treatment initiated: No    Cares/treatment/interventions/medications to be completed following ED care: continue with plan of care    ED Nurse Name: Deysi Campbell RN  4:16 AM   RECEIVING UNIT ED HANDOFF REVIEW    Above ED Nurse Handoff Report was reviewed: Yes  Reviewed by: Kate Leahy RN on January 9, 2024 at 3:47 PM

## 2024-01-09 NOTE — PLAN OF CARE
OUTPATIENT/OBSERVATION GOALS TO BE MET BEFORE DISCHARGE:  ADLs back to baseline: Yes    Activity and level of assistance: Pt baseline lift    Pain status: Pain free.    Return to near baseline physical activity: Yes     Discharge Planner Nurse   Safe discharge environment identified: Yes  Barriers to discharge: Yes       Entered by: Deysi Storm RN 01/09/2024 1200       Pt A&O to self. Declines pain. SLP reports puree diet with mildly thick liquids. Palliative consult.    Please review provider order for any additional goals.   Nurse to notify provider when observation goals have been met and patient is ready for discharge.Goal Outcome Evaluation:

## 2024-01-09 NOTE — ED NOTES
Urinary straight catheter performed on Pt per MD orders. RN assisted Writer due to Pts condition. Urine return obtained. Procedure performed with hospital sterile protocol. Sample sent to lab for UA.

## 2024-01-09 NOTE — ED TRIAGE NOTES
Pt arrives from memory care. EMS called due to pt becoming lethargic and weak. O2 73% RA. BG WDL unknown amount per EMS. Non rebreathe applied PTA. BUE blue, prolonged capillary refill, EMS unable to obtain VS. A/OX0.

## 2024-01-09 NOTE — ED PROVIDER NOTES
"    History     Chief Complaint:  Altered Mental Status       HPI     Leola Bui is a 88 year old female presents with episode of near syncope.  EMS arrives and is the primary historian.  Patient arrives from a skilled nursing facility as she has a history of Parkinson's disease.  They report patient had an episode in which she was globally weak prompting ED presentation.  They report many of the staff stated \"we are agency nurses and do not know the patient well.\"  Patient appeared cool to touch to EMS.  They were having difficulty obtaining O2 saturations secondary to poor perfusion.  Patient unable to provide any significant history.    Daughter later presented to the ED.  She received a call from staff at the facility in which patient was in a lift and became limp but was not unresponsive.  Patient has not been eating or drinking well the last few days secondary to refusal or spitting out food/fluids.  Patient had increased confusion over the last 2 weeks.  She has had problems with recent urinary tract infections but does not believe that she is currently on antibiotics.  Daughter confirms that patient is DNR/DNI and does not want to surgical interventions but does want medical management.    Independent Historian:    EMS and daughter as noted above    Review of External Notes:  None    Medications:    acetaminophen (TYLENOL) 325 MG tablet  allopurinol (ZYLOPRIM) 100 MG tablet  artificial saliva (BIOTENE MT) SOLN solution  carbidopa-levodopa (SINEMET CR)  MG CR tablet  cholecalciferol 25 MCG (1000 UT) TABS  cholecalciferol 50 MCG (2000 UT) CAPS  diclofenac (VOLTAREN) 1 % topical gel  ELIQUIS ANTICOAGULANT 5 MG tablet  fluticasone (FLONASE) 50 MCG/ACT nasal spray  furosemide (LASIX) 40 MG tablet  ketorolac (ACULAR) 0.5 % ophthalmic solution  ketorolac (ACULAR) 0.5 % ophthalmic solution  Lactobacillus (PROBIOTIC ACIDOPHILUS) CAPS  Lidocaine (LIDOCARE) 4 % Patch  losartan (COZAAR) 50 MG tablet  metoprolol " succinate ER (TOPROL XL) 50 MG 24 hr tablet  moxifloxacin (VIGAMOX) 0.5 % ophthalmic solution  MYRBETRIQ 25 MG 24 hr tablet  NYSTOP [id number] UNIT/GM powder  ofloxacin (OCUFLOX) 0.3 % ophthalmic solution  polyethyl glycol-propyl glycol 0.4-0.3 %  potassium chloride ER (K-TAB/KLOR-CON) 10 MEQ CR tablet  pramipexole (MIRAPEX) 0.125 MG tablet  prednisoLONE acetate (PRED FORTE) 1 % ophthalmic suspension  prednisoLONE acetate (PRED FORTE) 1 % ophthalmic suspension  sertraline (ZOLOFT) 25 MG tablet  sertraline (ZOLOFT) 50 MG tablet  simethicone (MYLICON) 80 MG chewable tablet        Past Medical History:    Past Medical History:   Diagnosis Date    Acute diastolic congestive heart failure (H)     Anxiety     Breast cancer (H)     CKD (chronic kidney disease) stage 3, GFR 30-59 ml/min (H)     Essential hypertension     Gout     Osteoporosis     Parkinson disease (H)     Parkinsons disease (H)        Past Surgical History:    Past Surgical History:   Procedure Laterality Date    MASTECTOMY      PHACOEMULSIFICATION CLEAR CORNEA WITH STANDARD INTRAOCULAR LENS IMPLANT Left 9/19/2022    Procedure: PHACOEMULSIFICATION, CATARACT, WITH INTRAOCULAR LENS IMPLANT LEFT;  Surgeon: Contreras Feldman MD;  Location: [facility] OR    PHACOEMULSIFICATION CLEAR CORNEA WITH STANDARD INTRAOCULAR LENS IMPLANT Right 12/26/2022    Procedure: PHACOEMULSIFICATION, CATARACT, WITH INTRAOCULAR LENS IMPLANT RIGHT;  Surgeon: Contreras Feldman MD;  Location: [facility] OR          Physical Exam   Patient Vitals for the past 24 hrs:   BP Temp Temp src Pulse Resp SpO2   01/08/24 1904 (!) 131/95 -- -- -- -- --   01/08/24 1858 -- 96.8  F (36  C) Temporal 65 24 92 %        Physical Exam      HEENT:   No scalp hematoma or defect to the bony calvarium.      Sevilla's and Racoon's sign negative.      Midface is stable.     Oropharynx is moist, without lesions or trismus.  EYES:   Conjunctiva normal, PERRL    EOMs intact  NECK:   C-spine non-tender    No bony  step-off to cervical spine.   CV:    Regular rate and rhythm.     No murmurs, rubs or gallops.    PULM:  Clear to auscultation bilateral.      No respiratory distress.      No subcutaneous emphysema or crepitus.  ABD:   Soft, non-tender, non-distended.      No rebound or guarding.  MSK:    No focal bony tenderness to the extremities.      Upper and lower extremities taken through full ROM without significant pain or limited ROM.    No palpable mass, hematoma or ecchymosis to the buttocks bilateral  LYMPH:  No cervical lymphadenopathy.  NEURO:  Alert and oriented to self only.     Speech is clear with no aphasia.      Strength is 5/5 in all 4 extremities.  Sensation is intact.      Normal muscular tone, no tremor.  SKIN:   Warm, dry and intact.          Emergency Department Course   ECG  ECG results from 01/08/24   EKG 12-lead, tracing only     Value    Systolic Blood Pressure     Diastolic Blood Pressure     Ventricular Rate 58    Atrial Rate     IN Interval     QRS Duration 90        QTc 402    P Axis     R AXIS 0    T Axis 53    Interpretation ECG      Atrial fibrillation with slow ventricular response  Nonspecific ST and T wave abnormality  Abnormal ECG  When compared with ECG of 03-OCT-2022 11:44,  No significant change was found           Imaging:  Head CT w/o contrast   Final Result   IMPRESSION:   1.  No CT evidence for acute intracranial process.   2.  Brain atrophy and presumed chronic microvascular ischemic changes as above.      CT Chest Abdomen Pelvis w/o Contrast   Final Result   IMPRESSION:   1.  CT findings suggestive of early fecal impaction and stercoral colitis.   2.  Cholelithiasis and mild gallbladder distention, incompletely evaluated due to study limitations. If there is high suspicion of acute cholecystitis, further evaluation with gallbladder sonography is recommended.   3.  Trace interstitial edema and trace right pleural effusion.   4.  Chronic, nonunited fracture of the L1 vertebral  body.   5.  Masslike lesions of the right piriformis and gluteus laurie muscle bellies, possibly hematomas, though neoplastic lesions are not excluded. Pelvic MR imaging with and without intravenous contrast is recommended for further characterization, when    clinically feasible.   6.  Mild right paratracheal lymphadenopathy. Chest CT follow-up recommended in 3-6 months.   7.  Ascending thoracic aortic aneurysm, measuring 4.0 cm.   8.  Cardiomegaly.         [Recommend Follow Up: Contrast-enhanced pelvic MR; chest CT follow-up in 3-6 months.]      This report will be copied to the [facility] to ensure a provider acknowledges the finding.            Report per radiology    Laboratory:  Labs Ordered and Resulted from Time of ED Arrival to Time of ED Departure   BLOOD GAS VENOUS WITH OXYHEMOGLOBIN - Abnormal       Result Value    pH Venous 7.28 (*)     pCO2 Venous 50      pO2 Venous 23 (*)     Bicarbonate Venous 23      FIO2 21      Oxyhemoglobin Venous 36 (*)     Base Excess/Deficit -4.0     COMPREHENSIVE METABOLIC PANEL - Abnormal    Sodium 132 (*)     Potassium 5.2      Carbon Dioxide (CO2) 19 (*)     Anion Gap 12      Urea Nitrogen 39.3 (*)     Creatinine 1.40 (*)     GFR Estimate 36 (*)     Calcium 9.0      Chloride 101      Glucose 106 (*)     Alkaline Phosphatase 69      AST 17      ALT <5      Protein Total 6.9      Albumin 3.5      Bilirubin Total 0.5     LACTIC ACID WHOLE BLOOD - Abnormal    Lactic Acid 3.4 (*)    TROPONIN T, HIGH SENSITIVITY - Abnormal    Troponin T, High Sensitivity 33 (*)    CBC WITH PLATELETS AND DIFFERENTIAL - Abnormal    WBC Count 10.1      RBC Count 3.99      Hemoglobin 13.3      Hematocrit 41.4       (*)     MCH 33.3 (*)     MCHC 32.1      RDW 14.6      Platelet Count 197      % Neutrophils 78      % Lymphocytes 9      % Monocytes 11      % Eosinophils 1      % Basophils 0      % Immature Granulocytes 1      NRBCs per 100 WBC 0      Absolute Neutrophils 7.8       Absolute Lymphocytes 0.9      Absolute Monocytes 1.1      Absolute Eosinophils 0.1      Absolute Basophils 0.0      Absolute Immature Granulocytes 0.1      Absolute NRBCs 0.0     ROUTINE UA WITH MICROSCOPIC REFLEX TO CULTURE   TROPONIN T, HIGH SENSITIVITY   LACTIC ACID WHOLE BLOOD            Emergency Department Course & Assessments:        Interventions:  Medications   Enema Compound (docusate/mineral oil/NaPhos) NO MAG CIT PREMIX (has no administration in time range)   lactated ringers BOLUS 1,000 mL (0 mLs Intravenous Stopped 24)   lactated ringers BOLUS 1,000 mL (1,000 mLs Intravenous $New Bag 24)        Independent Interpretation (X-rays, CTs, rhythm strip):  None    Consultations/Discussion of Management or Tests:  None       Social Determinants of Health affecting care:  None     Disposition:  Changed over to Dr. Troncoso    Impression & Plan        Medical Decision Makin-year-old female with Parkinson disease presents to the ED after an episode in which she became globally weak.  There were initial concerns of hypoxia and/or hypotension.  Patient was not hypoxic upon presentation.  There was difficulty obtaining a reliable O2 saturation secondary to poor peripheral perfusion.  She has not experienced any hypoxia or hypotension during ED course.    There were reports that patient had poor oral intake.  Laboratory studies are consistent with dehydration/intravascular volume depletion as evidenced by mild renal insufficiency.  Patient was given IV fluids and has no signs of volume loss in the ED.  CT scan of the chest, abdomen pelvis were undertaken.  Despite presence of cholelithiasis, she has no right upper quadrant tenderness to draw concern for cholecystitis or symptomatic biliary colic.  She has fecal impaction was provided pink lady enema.  I suspect episode of generalized weakness was likely related to transient hypotension secondary to dehydration.    EKG revealed atrial  fibrillation for which she has a history of.  There have been no ischemic changes.  Patient not overtly reporting chest pain or shortness of breath.  Troponin is elevated but may in part be related to volume depletion and advanced age.  2 hour troponin undertaken and pending.    In addition, patient has history of urinary tract infections.  Lactic is elevated but suspect lactate primarily elevated due to dehydration.  She has no evidence of soft tissue infection, pneumonia.  If UTI is present, I would recommend IV antibiotics with ceftriaxone and admission due to the presence of elevated lactate and renal insufficiency.    CT scan also reports a soft tissue mass to the right piriformis/gluteus laurie.  I do not appreciate hematoma or obvious mass on examination.  Patient's daughter was made aware of this mass.  She would not want workup including MRI or biopsy as this not consistent with goals of care.    I had a prolonged discussion with daughter regarding ultimate disposition.  We have ultimately decided that if 2 hour troponin is reassuring and urinalysis without infection, decision would be to discharge home.  She understands that patient may continue to have problems with dehydration secondary to worsening Parkinson's disease and she would not entertain G-tube insertion for hydration thus hospitalization would not be beneficial.  She lives in a skilled nursing facility.  Patient changed over to Dr. Troncoso to follow-up on urinalysis and second set troponin.    Diagnosis:    ICD-10-CM    1. Generalized weakness  R53.1       2. Dehydration  E86.0       3. Renal insufficiency  N28.9       4. Soft tissue mass  M79.89       5. Fecal impaction (H)  K56.41       6. Calculus of gallbladder without cholecystitis without obstruction  K80.20            Discharge Medications:  New Prescriptions    No medications on file          1/8/2024   Rodrick Franco MD Matthews, Jeremiah R, MD  01/08/24 4105

## 2024-01-09 NOTE — PLAN OF CARE
ROOM #  215    Living Situation (if not independent, order SW consult): Facility,   Facility name: NA  : [facility]    Activity level at baseline:   Activity level on admit: Assist x 2/lift    Who will be transporting you at discharge: Desmond Gill    Patient registered to observation; given Patient Bill of Rights; given the opportunity to ask questions about observation status and their plan of care.  Patient has been oriented to the observation room, bathroom and call light is in place.    Discussed discharge goals and expectations with patient/family. Yes        Goal Outcome Evaluation:

## 2024-01-09 NOTE — ED NOTES
I followed up several studies and results for Dr. Franco.  The patient did have a moderate bowel movement following enema.  Her delta troponin was flat and nonconcerning.  Urinalysis did not show definite evidence of infection.  Upon recheck the patient does appear very weak.  I had a long discussion with the patient's daughter who after considering for some time is now concerned about the patient ability to care for herself especially with decreased oral intake.  She states the patient typically needs a lift to get in and out of bed but has to go to stand up to do that and does not feel like she can.  For all these reasons the patient will be admitted to the hospital under observation status.  I think palliative care consultation would be very reasonable during her stay.  Patient and daughter are comfortable with this plan and the patient be admitted to [facility] from the hospital service     Estuardo Troncoso MD  01/09/24 0357

## 2024-01-09 NOTE — PLAN OF CARE
"[facility]    ED Boarding Nurse Handoff Addendum Report:    Date/time: 1/9/2024, 5:04 PM    Activity Level: in bed    Fall Risk: Yes:  activity supervised    Active Infusions:     Current Meds Due:     Current care needs: Assist with cares     Oxygen requirements (liters/min and/or FiO2):     Respiratory status: Room air    Vital signs (within last 30 minutes):    Vitals:    01/09/24 0850 01/09/24 1323 01/09/24 1446 01/09/24 1645   BP: (!) 117/96 (!) 159/93 97/63 (!) 154/78   BP Location: Left arm  Right arm Left arm   Patient Position:   Supine    Pulse:   74 68   Resp:    19   Temp:    97.8  F (36.6  C)   TempSrc:    Axillary   SpO2: 96% 93% 96% 98%       Focused assessment within last 30 minutes:    Pt A to self only. No c/o pain but does repeat \"help me\" a lot. Palliative consult.     ED Boarding Nurse name: Deysi Storm RN                         "

## 2024-01-09 NOTE — PLAN OF CARE
OUTPATIENT/OBSERVATION GOALS TO BE MET BEFORE DISCHARGE:  ADLs back to baseline: No    Activity and level of assistance: W/C level- needs lift    Pain status: Pain free.    Return to near baseline physical activity: Yes     Discharge Planner Nurse   Safe discharge environment identified: Yes  Barriers to discharge: Yes       Entered by: Deysi Storm RN 01/09/2024 800     Pt A&O x0. Pt NPO. No c/o pain.     Please review provider order for any additional goals.   Nurse to notify provider when observation goals have been met and patient is ready for discharge.

## 2024-01-10 NOTE — CONSULTS
Care Management Discharge Note    Discharge Date: 01/10/2024     Discharge Disposition: [facility]    Discharge Transportation: Stretcher [id number] 1/10    Private pay costs discussed: transportation costs    Does the patient's insurance plan have a 3 day qualifying hospital stay waiver?  No    Education Provided on the Discharge Plan: Yes   Persons Notified of Discharge Plans: [facility], family  Patient/Family in Agreement with the Plan:  Yes    Handoff Referral Completed: No    Additional Information:  Per provider, patient medically ready to return to the [facility] today.  placed call to facility, [phone], spoke with RN Mary Alice. They can accept patient back today. Orders to be faxed to (f) [phone].     Reviewed out of pocket cost for [facility] stretcher transport, $1117.00 for base rate and $26.06 per mile to the destination. Pt/family expressed understanding and are agreeable to this.      Patient requires stretcher transportation due to dementia requiring supervision.    Stretcher transportation has been arranged for [id number] 1/10. Patient and bedside nurse notified of transportation time.    Ambulance PCS form completed and placed in patient chart. Ambulance PCS form should be given to transportation team.    ARUNA Manuel, [facility]   Inpatient Care Coordination  [facility]   [phone]

## 2024-01-10 NOTE — DISCHARGE SUMMARY
[facility]  Hospitalist Discharge Summary      Date of Admission:  1/8/2024  Date of Discharge:  1/10/2024  Discharging Provider: SUMANTH Russo PA-C  Discharge Service: Hospitalist Service    Discharge Diagnoses   Generalized weakness  Failure to thrive  Incidental masslike lesion of the right piriformis, gluteus laurie and peritracheal lymphadenopathy  Constipation with fecal impaction  JOANNE on stage III CKD, hypovolemic hyponatremia  Lactic acidosis  Troponin elevation due to demand ischemia    Follow-ups Needed After Discharge   Follow up with primary care provider, Kristopher Barahona, within 7 days for hospital follow- up, parkinson's management and question about prophylaxis antibiotics for UTI.      Discharge Disposition   Discharged back to memory care  Condition at discharge: Stable    Hospital Course   Leola Bui is a 88 year old female with a history of hypertension, chronic diastolic CHF, stage III CKD, chronic atrial fibrillation, and advanced Parkinson's with dementia residing in a memory care unit who presents with failure to thrive.    Assumed patient care today.  Patient was alert and interactive during evaluation.  She had breakfast and lunch.  Denied any pain or concerns.  Spoke with patient's family.  Suspect that her dizziness or poor oral intake is progressive decline due to her age and worsening Parkinson's disease with associated dementia.  Patient seemed to improve with IV fluids.  Hyponatremia and JOANNE resolved.  Was seen by palliative care however family is not interested on hospice at this time.  SLP evaluated the patient and gave modified diet recommendations which were provided in the AVS.  Previous provider spoke about incidental masslike lesions and lymphadenopathy.  Family did not want to pursue any further workup.  Was discharged back to her memory care facility.  Should follow-up with her primary care provider in 1 to 2 weeks.  Family had questions about sleep  medication and prophylactic antibiotics for UTI.  Recommended to family that they discuss this with her primary care provider at this follow-up visit.     Problem list:  Generalized weakness and failure to thrive: Patient's daughter brought her to the hospital today due to overall generalized weakness and poor oral intake over the past several weeks but also the patient became limp when facility staff had put her in a left.  She does appear mildly volume depleted and has some mild acute kidney injury but overall no significant acute processes been identified.  Overall I suspect this is progressive decline due to age and worsening Parkinson's disease with associated dementia.  She already resides at a memory care unit and is at baseline in a wheelchair.   Incidental masslike lesions of the right piriformis and gluteus laurie and peritracheal lymphadenopathy: Could be hematomas but neoplastic lesions not excluded.  Pelvic MRI recommended for further characterization.  Discussed with daughter who does not want further investigation due to her mother's elderly age, dementia, and fairly limited care plan.  Radiology also recommending follow-up CT regarding the lymphadenopathy in 3 to 6 months.  Daughter also not interested in surveillance or workup of this finding.  Constipation and fecal impaction: Patient received an enema with good results in the ED.  Continuing aggressive bowel regimen.  Acute kidney injury on stage III CKD: Current creatinine is 1.4.  Creatinine Arely of this year was 0.6.  She does appear hypovolemic on examination so we will continue normal saline at 75 cc/h.    Urinary tract infection: She is currently being treated with oral antibiotics for UTI.  Urinalysis here is fairly unremarkable.  The daughter is unclear what the antibiotic is called but we will await formal medication reconciliation and continue the entire course of the antibiotic.  Hypovolemic hyponatremia: Sodium is slightly low at 132.   We will perform IV fluid administration.  Lactic acidosis: As above, likely related to volume depletion and hypovolemia.  She does not appear septic.  She is afebrile without leukocytosis and no infectious etiology has been identified.  Will continue fluid resuscitation and recheck a lactic acid level.  Troponin elevation: Likely demand ischemia.  Troponin level is downtrending already.  She has not had any chest pain.  EKG shows rate controlled atrial fibrillation with nonspecific ST and T wave abnormalities.  Will place the patient on cardiac monitoring but otherwise not pursue further workup.  Atrial fibrillation: Currently rate controlled.  Continue metoprolol and Eliquis for stroke prophylaxis.  Parkinson's disease with advanced dementia: She resides in a memory care unit.  At baseline she is in a wheelchair.  Will continue her Sinemet.  Hypertension: Blood pressure is somewhat elevated.  Will continue her prior to admission metoprolol but hold the losartan with acute kidney injury.    Consultations This Hospital Stay   SPEECH LANGUAGE PATH ADULT IP CONSULT  CARE MANAGEMENT / SOCIAL WORK IP CONSULT  PHYSICAL THERAPY ADULT IP CONSULT  PALLIATIVE CARE ADULT IP CONSULT  CARE MANAGEMENT / SOCIAL WORK IP CONSULT    Code Status   No CPR- Do NOT Intubate    Time Spent on this Encounter   I, SUMANTH Russo PA-C, personally saw the patient today and spent less than or equal to 30 minutes discharging this patient.       SUMANTH Russo PA-C  [facility] OBSERVATION DEPT  [address] [place] [address]  Phone: [phone]  ______________________________________________________________________    Physical Exam   Vital Signs: Temp: 98  F (36.7  C) Temp src: Oral BP: (!) 160/79 Pulse: 85   Resp: 18 SpO2: 96 % O2 Device: None (Room air)    Weight: 151 lbs .24 oz    GENERAL:  Alert, Comfortable, No acute distress. Laying in bed.   PSYCH: pleasant  HEENT:  Normocephalic, No scleral icterus or  conjunctival injection  HEART:  Normal S1, S2 with no murmur, RRR  LUNGS:  Normal Respiratory effort. Clear to auscultation bilaterally with no wheezing, rales or ronchi.  ABDOMEN:  Soft, non-tender, non distended. No peritoneal signs.   EXTREMITIES:  No pitting pedal edema, No cyanosis.   SKIN:  Warm, dry to touch  NEUROLOGIC: Speech clear, alert       Primary Care Physician   Kristopher Barahona    Discharge Orders      Reason for your hospital stay    You were admitted to the hospital for weakness. The workup was fairly unrevealing except for elevated kidney function and dehydration. Your kidney function improved with IV fluids. SLP made some changes to her diet which are listed on the discharge summary. You also had some constipation which improved after a enema. Please follow up with your primary care provider for basic hospital follow and to discuss the questions about parkinson's night time/ sleep medications and prophylactic antibiotics for UTI.     Follow-up and recommended labs and tests     Follow up with primary care provider, Kristopher Barahona, within 7 days for hospital follow- up, parkinson's management and question about prophylaxis antibiotics for UTI.     Activity    Your activity upon discharge: activity as tolerated     Diet    Follow this diet upon discharge: Combination Diet Pureed Diet (level 4); Mildly Thick (level 2) (1:1 assist, if pt alert/upright/accepting, single bites/sips at a time, liquid wash every 2-3 bites; hold if any coughing/difficulty.)       Significant Results and Procedures   Results for orders placed or performed during the hospital encounter of 01/08/24   Head CT w/o contrast    Narrative    EXAM: CT HEAD W/O CONTRAST  LOCATION: [facility]  DATE: 1/8/2024    INDICATION: Altered mental status.  COMPARISON: None.  TECHNIQUE: Routine CT Head without IV contrast. Multiplanar reformats. Dose reduction techniques were used.    FINDINGS:  INTRACRANIAL CONTENTS: No  intracranial hemorrhage, extraaxial collection, or mass effect.  No CT evidence of acute infarct. Mild to moderate presumed chronic small vessel ischemic changes. Mild to moderate generalized volume loss. No hydrocephalus.     VISUALIZED ORBITS/SINUSES/MASTOIDS: No intraorbital abnormality. Complete opacification left maxillary sinus. No middle ear or mastoid effusion.    BONES/SOFT TISSUES: No acute abnormality.      Impression    IMPRESSION:  1.  No CT evidence for acute intracranial process.  2.  Brain atrophy and presumed chronic microvascular ischemic changes as above.   CT Chest Abdomen Pelvis w/o Contrast     Value    Radiologist flags      Contrast-enhanced pelvic MR; chest CT follow-up in 3-6 months.    Narrative    EXAM: CT CHEST ABDOMEN PELVIS W/O CONTRAST  LOCATION: [facility]  DATE: 1/8/2024    INDICATION: Dyspnea, abdominal pain, and hypoxia.  COMPARISON: None available.  TECHNIQUE: CT scan of the chest, abdomen, and pelvis was performed without IV contrast. Multiplanar reformats were obtained. Dose reduction techniques were used.   CONTRAST: None.    FINDINGS: Absence of intravenous contrast limits the sensitivity of this examination for detection of infectious/inflammatory change, post traumatic abnormalities, vascular abnormalities, and visceral lesions. Patient was imaged with arm(s) at side,   limiting study quality. Study is also further degraded by motion.    LUNGS AND PLEURA: Trachea and large airways are patent. No focal airspace consolidation. Mild dependent atelectasis and trace interstitial edema.    No suspicious pulmonary nodule.    No pneumothorax.    Trace right pleural effusion.     MEDIASTINUM/AXILLAE: Enlarged right paratracheal lymph node, measuring 11 mm in short axis, series 3 image 51. No appreciable hilar lymphadenopathy, within limitation of the noncontrast technique.     Thoracic esophagus is unremarkable.       No axillary lymphadenopathy.    Chest wall  is unremarkable.    Ascending thoracic aortic aneurysm, measuring 4.0 cm. Mild to moderate atherosclerotic calcifications.    Mild-to-moderate cardiomegaly. Dense calcifications of the mitral annulus. No pericardial effusion.    CORONARY ARTERY CALCIFICATION: Moderate.    HEPATOBILIARY: Liver is normal in attenuation and size.    Cholelithiasis within a mildly distended gallbladder. Further assessment of the gallbladder is limited by motion.    No intrahepatic or intrahepatic biliary ductal dilatation.    PANCREAS: Diffuse fatty infiltration.    SPLEEN: Normal attenuation. Normal size.    ADRENAL GLANDS: Normal.    KIDNEYS: No hydronephrosis.    No nephroureterolithiasis.    Urinary bladder is unremarkable.    PELVIC ORGANS: No suspicious abnormality. Calcified uterine fibroid disease.    BOWEL: Moderate rectal stool burden, with multiple peripheral wall thickening and presacral fat stranding, suggesting an underlying stercoral colitis. Milder proximal colonic stool burden. Colonic diverticulosis. Appendix not visualized.    No intraperitoneal free fluid or free air.    LYMPH NODES: No suspicious abdominal or pelvic lymphadenopathy.    VASCULATURE: No abdominal aortic aneurysm. Moderate atherosclerotic calcifications.    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 Masslike lesions of the right piriformis and right gluteus laurie muscle bellies, measuring up to 4.8 and 3.6 cm, respectively. Central   hyperdensity associated with the right gluteus stability lesion suggests this may reflect a hematoma, though remains technically indeterminate.    OTHER: No additionally significant abnormalities.      Impression    IMPRESSION:  1.  CT findings suggestive of early fecal impaction and stercoral colitis.  2.  Cholelithiasis and mild gallbladder distention, incompletely evaluated due to study limitations. If there is high suspicion of acute  cholecystitis, further evaluation with gallbladder sonography is recommended.  3.  Trace interstitial edema and trace right pleural effusion.  4.  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  5.  Masslike lesions of the right piriformis and gluteus laurie muscle bellies, possibly hematomas, though neoplastic lesions are not excluded. Pelvic MR imaging with and without intravenous contrast is recommended for further characterization, when   clinically feasible.  6.  Mild right paratracheal lymphadenopathy. Chest CT follow-up recommended in 3-6 months.  7.  Ascending thoracic aortic aneurysm, measuring 4.0 cm.  8.  Cardiomegaly.      [Recommend Follow Up: Contrast-enhanced pelvic MR; chest CT follow-up in 3-6 months.]    This report will be copied to the [facility] to ensure a provider acknowledges the finding.          Discharge Medications   Current Discharge Medication List        CONTINUE these medications which have NOT CHANGED    Details   acetaminophen (TYLENOL) 500 MG tablet Take 1,000 mg by mouth 3 times daily      allopurinol (ZYLOPRIM) 100 MG tablet Take 100 mg by mouth at bedtime      apixaban ANTICOAGULANT (ELIQUIS) 5 MG tablet Take 5 mg by mouth 2 times daily      Artificial Saliva (BIOTENE DRY MOUTH) LOZG Take 1 lozenge by mouth 4 times daily as needed (dry mouth)      benzocaine (ORAJEL MAXIMUM STRENGTH) 20 % GEL gel Take by mouth 4 times daily as needed for mouth sores (apply pea-size amount to painful teeth/gums)      calcium carbonate (TUMS) 500 MG chewable tablet Take 1 chew tab by mouth every 6 hours as needed for heartburn      carbidopa-levodopa (SINEMET CR)  MG CR tablet Take 1 tablet by mouth at bedtime      !! carbidopa-levodopa (SINEMET)  MG tablet Take 1 tablet by mouth 3 times daily      !! carbidopa-levodopa (SINEMET)  MG tablet Take 0.5 tablets by mouth daily as needed (Parkinson's disease)      !! carbidopa-levodopa (SINEMET)  MG tablet  Take 0.5 tablets by mouth at bedtime      cetirizine (ZYRTEC) 5 MG tablet Take 5 mg by mouth daily      Cranberry 250 MG CAPS Take 500 mg by mouth daily      diclofenac (VOLTAREN) 1 % topical gel Apply 4 g topically 3 times daily      doxepin (SILENOR) 3 MG tablet Take 3 mg by mouth at bedtime      fluticasone (FLONASE) 50 MCG/ACT nasal spray Spray 1 spray into both nostrils daily      furosemide (LASIX) 40 MG tablet Take 40 mg by mouth daily      gabapentin (NEURONTIN) 100 MG capsule Take 100 mg by mouth 3 times daily      hydrOXYzine HCl (ATARAX) 25 MG tablet Take 25 mg by mouth every 8 hours as needed for anxiety      Lactobacillus (ACIDOPHILUS PO) Take 1 capsule by mouth daily 250 million      lidocaine (LMX4) 4 % external cream Apply topically 3 times daily Apply pea sized amount to back three times daily for low back pain      loperamide (IMODIUM) 2 MG capsule Take 2 mg by mouth 2 times daily as needed for diarrhea      !! losartan (COZAAR) 25 MG tablet Take 25 mg by mouth every evening      !! losartan (COZAAR) 50 MG tablet Take 50 mg by mouth every morning      metoprolol succinate ER (TOPROL XL) 50 MG 24 hr tablet Take 50 mg by mouth 2 times daily      mirabegron (MYRBETRIQ) 25 MG 24 hr tablet Take 25 mg by mouth at bedtime      nystatin (MYCOSTATIN) 111230 UNIT/GM external powder Apply topically 2 times daily as needed (under  right breast)      polyethylene glycol (MIRALAX) 17 g packet Take 8.5 g by mouth every other day      polyethylene glycol-propylene glycol (SYSTANE ULTRA) 0.4-0.3 % SOLN ophthalmic solution Place 1 drop into both eyes every 4 hours as needed for dry eyes      potassium chloride ER (MICRO-K) 10 MEQ CR capsule Take 20 mEq by mouth 2 times daily      pramipexole (MIRAPEX) 0.125 MG tablet Take 0.125 mg by mouth every evening      pramox-pe-glycerin-petrolatum (PREPARATION H) 1-0.25-14.4-15 % CREA cream Place rectally 4 times daily as needed for hemorrhoids      sertraline (ZOLOFT) 100 MG  tablet Take 100 mg by mouth daily      simethicone (MYLICON) 125 MG chewable tablet Take 125 mg by mouth 4 times daily as needed for intestinal gas      traMADol (ULTRAM) 50 MG tablet Take 25 mg by mouth 2 times daily as needed for pain      traZODone (DESYREL) 50 MG tablet Take 50 mg by mouth at bedtime      vitamin D3 (CHOLECALCIFEROL) 50 mcg (2000 units) tablet Take 50 mcg by mouth daily       !! - Potential duplicate medications found. Please discuss with provider.        Allergies   Allergies   Allergen Reactions    Erythromycin Hives and Rash    Penicillins Hives, Itching and Rash    Sulfa Antibiotics Hives and Rash    Amlodipine      Other reaction(s): *Unknown    Carvedilol      Other reaction(s): Syncope    Clonidine      Other reaction(s): *Unknown    Diltiazem      Other reaction(s): *Unknown    Flagyl [Metronidazole]     Hydrochlorothiazide      Other reaction(s): *Unknown    Lisinopril

## 2024-01-10 NOTE — PLAN OF CARE
"PRIMARY DIAGNOSIS: AMS and FTT  OUTPATIENT/OBSERVATION GOALS TO BE MET BEFORE DISCHARGE:  ADLs back to baseline: No    Activity and level of assistance: Assist of 2 with Lift    Pain status: PAINAD score of 1-5/10    Return to near baseline physical activity: No     Discharge Planner Nurse   Safe discharge environment identified: Yes  Barriers to discharge: No       Entered by: Balta Rob RN 01/10/2024 4:02 PM     Patient only oriented to self and place. Confused and repeatedly says \"help me\" during cares. Her VS are stable excluding HTN on RA. Had left PIV infusing 75 mL/hr NS until discontinued. Verbally states she is in pain - her PAINAD was 5/10 - given tramadol and acetaminophen - PAINAD then 1/10.   On puree diet - level 2 thick liquid. 1:1 supervision eating. Poor oral intake. Incontinent of urine and feces.   Lift assist of 2.   Plan to discharge back to .   BP (!) 149/85 (BP Location: Right arm)   Pulse 77   Temp 98  F (36.7  C) (Oral)   Resp 16   Ht 1.676 m (5' 6\")   Wt 68.5 kg (151 lb 0.2 oz)   SpO2 98%   BMI 24.37 kg/m     Please review provider order for any additional goals.   Nurse to notify provider when observation goals have been met and patient is ready for discharge.  "

## 2024-01-10 NOTE — PROGRESS NOTES
Patient's After Visit Summary was reviewed with patient and/or packet printed out for memory care staff.   Patient verbalized understanding of After Visit Summary, recommended follow up and was given an opportunity to ask questions.   Discharge medications sent home with patient/family: YES, No, Not applicable   Discharged with transport techs.        Transport techs here appx. [phone] this afternoon to transport pt. Back to her memory care.Day RN had pt. completely ready to be picked up and packet was printed and given to transport upon their arrival.[facility] without incident. Pt. Was driven to facility.

## 2024-01-10 NOTE — PLAN OF CARE
"PRIMARY DIAGNOSIS: altered mental status/near syncope  OUTPATIENT/OBSERVATION GOALS TO BE MET BEFORE DISCHARGE:  ADLs back to baseline: No    Activity and level of assistance: Ax-2 with a lift    Pain status: Pain free.    Return to near baseline physical activity: No     Discharge Planner Nurse   Safe discharge environment identified: Yes  Barriers to discharge: Yes       Entered by: Marianne Cardoso RN 01/10/2024 1:38 AM     Please review provider order for any additional goals.   Nurse to notify provider when observation goals have been met and patient is ready for discharge.     BP (!) 176/95 (BP Location: Right arm)   Pulse 75   Temp 97.9  F (36.6  C) (Oral)   Resp 18   Ht 1.676 m (5' 6\")   Wt 68.5 kg (151 lb 0.2 oz)   SpO2 96%   BMI 24.37 kg/m       Patient is Disorientated x4.   They are 2 assist with Lift.    Pt is a pureed and mildly thick diet. Pt requires assistance with feeding.   They are not showing any nonverbal signs of being in pain, Pt also states not being in pain.    Patient has Normal Saline 0.9% running at 75 mL per hour. New IV placed.         "

## 2024-01-10 NOTE — PLAN OF CARE
"PRIMARY DIAGNOSIS: altered mental status/near syncope  OUTPATIENT/OBSERVATION GOALS TO BE MET BEFORE DISCHARGE:  ADLs back to baseline: No    Activity and level of assistance: Ax-2 with a lift    Pain status: Pain free.    Return to near baseline physical activity: No     Discharge Planner Nurse   Safe discharge environment identified: Yes  Barriers to discharge: Yes       Entered by: Marianne Cardoso RN 01/10/2024 5:14 AM     Please review provider order for any additional goals.   Nurse to notify provider when observation goals have been met and patient is ready for discharge.     BP (!) 154/94 (BP Location: Right arm)   Pulse 75   Temp 97.9  F (36.6  C) (Oral)   Resp 18   Ht 1.676 m (5' 6\")   Wt 68.5 kg (151 lb 0.2 oz)   SpO2 96%   BMI 24.37 kg/m       Patient is Disorientated x4.   They are 2 assist with Lift.   Pt not out of bed during this time.  Pt is a pureed and mildly thick diet. Pt requires assistance with feeding. Pudding provided  They are not showing any nonverbal signs of being in pain, Pt also states not being in pain.    Patient has Normal Saline 0.9% running at 75 mL per hour. New IV and no no in place.  Purewick removed, 1 Large BM, Incontinent cares provided.  Discharge is TBD      "

## 2024-01-10 NOTE — PROGRESS NOTES
Speech Language Therapy Discharge Summary    Reason for therapy discharge:    Discharged to [facility] facility.    Progress towards therapy goal(s). See goals on Care Plan in [facility] electronic health record for goal details.  Goals partially met.  Barriers to achieving goals:   discharge from facility.    Therapy recommendation(s):    Continued therapy is recommended.  Rationale/Recommendations:  recommend SLP tx at [facility] for dysphagia for below baseline oropharyngeal swallowing.    At the time of discharge SLP recommended puree texture solids (IDDSI 4), mildly thick liquids (2) with 1:1 assist, if pt alert/upright/accepting, single bites/sips at a time, liquid wash every 2-3 bites; hold if any coughing/difficulty.

## 2024-01-10 NOTE — PLAN OF CARE
"CARE FROM 1630 - 2300  PRIMARY DIAGNOSIS: Altered Mental Status/Near Syncope  OUTPATIENT/OBSERVATION GOALS TO BE MET BEFORE DISCHARGE:  ADLs back to baseline: No    Activity and level of assistance: Assist x 2/Lift    Pain status: Pain free.    Return to near baseline physical activity: No     Discharge Planner Nurse   Safe discharge environment identified: Yes  Barriers to discharge: Yes       Entered by: Kate Leahy RN 01/09/2024 6:30 PM  Patient is A & O x 1, person only. Lung Sounds CTA, BS active, LBM 1/9 per patient. Pt is assist x 2 with lift. Incontinent of bladder, external catheter in place, voiding adequately. Pt denies pain, however trying to pull out the IV line even with the no no in place. Tolerating puree/thick diet and po meds well. PIV to RLA infusing NS at 75 ml/hr. SW, Palliative Care and Speech Pathologist consulted. Will continue to provide comfort cares.  BP (!) 166/87 (BP Location: Left arm)   Pulse 83   Temp 97.6  F (36.4  C) (Axillary)   Resp 22   Ht 1.676 m (5' 6\")   Wt 68.5 kg (151 lb 0.2 oz)   SpO2 97%   BMI 24.37 kg/m       Please review provider order for any additional goals.   Nurse to notify provider when observation goals have been met and patient is ready for discharge.      "

## 2024-01-10 NOTE — PLAN OF CARE
PRIMARY DIAGNOSIS: altered mental status/near syncope  OUTPATIENT/OBSERVATION GOALS TO BE MET BEFORE DISCHARGE:  ADLs back to baseline: No    Activity and level of assistance: Ax-2 with a lift    Pain status: Pain free.    Return to near baseline physical activity: No     Discharge Planner Nurse   Safe discharge environment identified: Yes  Barriers to discharge: Yes       Entered by: Marianne Cardoso RN 01/10/2024 1:32 AM     Please review provider order for any additional goals.   Nurse to notify provider when observation goals have been met and patient is ready for discharge.    Vitals are Temp: 97.8  F (36.6  C) Temp src: Axillary BP: 137/74 Pulse: 77   Resp: 20 SpO2: 96 %.  Patient is Disorientated x4.   They are 2 assist with Lift.    Pt is a pureed and mildly thick diet. Pt requires assistance with feeding.   They are not showing any nonverbal signs of being in pain, Pt also states not being in pain.    Patient has Normal Saline 0.9% running at 75 mL per hour. New IV placed.   Repositions x2   Purewick in place, bowels sounds present but faint

## 2024-01-10 NOTE — PLAN OF CARE
"PRIMARY DIAGNOSIS: AMS and FTT  OUTPATIENT/OBSERVATION GOALS TO BE MET BEFORE DISCHARGE:  ADLs back to baseline: No    Activity and level of assistance: Assist of 2 with Lift    Pain status: PAINAD score of 1-5/10    Return to near baseline physical activity: No     Discharge Planner Nurse   Safe discharge environment identified: Yes  Barriers to discharge: No       Entered by: Balta Rob RN 01/10/2024 4:02 PM     BP (!) 149/85 (BP Location: Right arm)   Pulse 77   Temp 98  F (36.7  C) (Oral)   Resp 16   Ht 1.676 m (5' 6\")   Wt 68.5 kg (151 lb 0.2 oz)   SpO2 98%   BMI 24.37 kg/m     Please review provider order for any additional goals.   Nurse to notify provider when observation goals have been met and patient is ready for discharge.  "

## 2024-01-10 NOTE — PLAN OF CARE
PT: Orders received. Chart reviewed and discussed with care team at AM rounds. IP PT not indicated due to patient is total cares at baseline, WC bound and uses a lift for transfers.  Defer discharge recommendations to care team.  Will complete orders.

## 2024-02-29 PROBLEM — I61.9 INTRAPARENCHYMAL HEMORRHAGE OF BRAIN (H): Status: ACTIVE | Noted: 2024-01-01

## 2024-02-29 NOTE — PROGRESS NOTES
Responded to silent red to assist with respiratory support if necessary.    Pt is unlabored on RA with sat's of 97%.    No interventions necessary at this time.    Jeimy Cooper, RT on 2/29/2024 at 7:50 AM     Detail Level: Simple

## 2024-02-29 NOTE — CONSULTS
Palliative Care Consultation Note  [facility]      Patient: Leola Bui  Date of Admission:  2024    Requesting Clinician / Team: hospitalist  Reason for consult: Symptom management  Goals of care  Decisional support  Patient and family support       Recommendations & Counseling     GOALS OF CARE:   Comfort focused     ADVANCE CARE PLANNING:  Patient has an advance directive dated 7/3/2019.  Primary Health Care Agent Rad Bui ().  Alternate(s) Bridget Bui and Sonja Price.   There is no POLST form on file, defer to patient and/or next of kin for decisions   Code status: No CPR- Do NOT Intubate    MEDICAL MANAGEMENT:   Comfort Care      #Pain  - 1st choice:Morphine PO/SL 5-10 mg q 1 hour as needed.   - 2nd choice:Morphine IV 1-2 mg q 15 minutes as needed     #Air hunger/Dyspnea   - 1st choice:Morphine PO/SL 5-10 mg q 1 hour as needed.   - 2nd choice:Morphine IV 1-2 mg q 15 minutes as needed     #Anxiety    - 1st choice: Lorazepam PO/SL q 1 mg  q 3 hour as needed.   - 2nd choice: Lorazepam IV q 1 mg  q 3 hour as needed    #Potential seizures   - Added scheduled lorazepam to prevent seizures  -Valium for seizures as well    #Secretion burden   -Robinul 0.1 mg  q 4 hours as needed. If ineffective, increase up to 0.3 mg   -Atropine     #Nausea   -Zofran 4 mg q 6 hours as needed. Can increase to 8 mg   -Zyprexa 5 mg q 6 hours as needed     #Agitation  -Aggressive symptoms control first, then  -1st choice: Zyprexa 5 mg ODT or IM   -2nd choice: Increase dose of Zyprexa to 10 mg or consider switch to Haldol   -3rd choice: Lorazepam as above     PSYCHOSOCIAL/SPIRITUAL SUPPORT:  Family   [place] community: Janice   Would appreciate Spiritual Health Services, Consult has been placed for support     Palliative Care will continue to follow. Thank you for the consult and allowing us to aid in the care of Leola Bui.    These recommendations have been discussed with medical team.    Lexie CHEN  TADEO Menendez  Securely message with [employer] (more info)  Text page via [facility] Paging/Directory       Assessment      Leola Bui is a 88 year old female with a past medical history of HTN, chronic diastolic heart failures, CKD3, chronic atrial fibrillation, parkinson's, dementia- lives in memory cares who presented on 2/29 with emesis, unresponsiveness.    Today, the patient was seen for:  Goals of care  Emotional and decisional support    Palliative Care Summary:   Met with daughter at the bedside.   I introduced our role as an extra layer of support and how we help patients and families dealing with serious, potentially life-limiting illnesses. I explained the composition of the palliative care team.  Palliative care helps patients and families navigate their care while focusing on the whole person; providing emotional, social and spiritual support  Palliative care often assists with symptom management, information sharing about what to expect from the illness, available treatment options and what effect those options may have on the disease course, and provide effective communication and caring support.    Prognosis, Goals, & Planning:    Functional Status just prior to this current hospitalization:  Overall decline    Prognosis, Goals, and/or Advance Care Planning:  Education provided on transition to comfort-focused goals of care would be including discontinuation of IV fluids, cardiac monitoring, labs, tube feeding, TPN and other interventions that do not directly promote comfort.  Anticipatory guidance was given regarding feeding, hunger, fluids at end of life. We discussed utilization of medications to ease air hunger, agitation and restlessness at the time that ventilator is compassionately withdrawn.  Discussed that this process is very purposeful in terms of ensuring patient is as comfortable as possible and that family wishes are honored.    Code Status was addressed today:   yes        Patient has  decision-making capacity today for complex decisions:Unreliable            Coping, Meaning, & Spirituality:   Mood, coping, and/or meaning in the context of serious illness were addressed today: Yes    Social:   Living situation:resides in assisted living Memory care    Medications:  I have reviewed this patient's medication profile and medications from this hospitalization. Notable medications: none     ROS:  Comprehensive ROS is reviewed and is negative except as here & per HPI:     Physical Exam   Vital Signs with Ranges  Pulse:  [91] 91  Resp:  [23] 23  SpO2:  [97 %] 97 %  0 lbs 0 oz    PHYSICAL EXAM:  Constitutional: unresponsive   Cardiovascular: negative  Respiratory: negative  Psychiatric: Unresponsive  Abdomen: Abdomen soft, non-tender. BS normal. No masses, organomegaly  Pain: No s/s of pain    Data reviewed:  Results for orders placed or performed during the hospital encounter of 02/29/24 (from the past 24 hour(s))   CBC with Platelets & Differential    Narrative    The following orders were created for panel order CBC with Platelets & Differential.  Procedure                               Abnormality         Status                     ---------                               -----------         ------                     CBC with platelets and d...[[id number]]  Abnormal            Final result                 Please view results for these tests on the individual orders.   Basic metabolic panel   Result Value Ref Range    Sodium 136 135 - 145 mmol/L    Potassium 4.2 3.4 - 5.3 mmol/L    Chloride 98 98 - 107 mmol/L    Carbon Dioxide (CO2) 23 22 - 29 mmol/L    Anion Gap 15 7 - 15 mmol/L    Urea Nitrogen 18.3 8.0 - 23.0 mg/dL    Creatinine 0.84 0.51 - 0.95 mg/dL    GFR Estimate 66 >60 mL/min/1.73m2    Calcium 9.4 8.8 - 10.2 mg/dL    Glucose 176 (H) 70 - 99 mg/dL   INR   Result Value Ref Range    INR 1.47 (H) 0.85 - 1.15   Partial thromboplastin time   Result Value Ref Range    aPTT 34 22 - 38 Seconds   Troponin  T, High Sensitivity   Result Value Ref Range    Troponin T, High Sensitivity 18 (H) <=14 ng/L   Extra Tube ([place] Draw)    Narrative    The following orders were created for panel order Extra Tube ([place] Draw).  Procedure                               Abnormality         Status                     ---------                               -----------         ------                     Extra Blood Culture Bottle[[id number]]                       Final result               Extra Blue Top Tube[[id number]]                                                         Extra Red Top Tube[[id number]]                               Final result               Extra Green Top (Lithium...[[id number]]                                                 Extra Purple Top Tube[[id number]]                                                       Extra Green Top (Lithium...[[id number]]                                                   Please view results for these tests on the individual orders.   CBC with platelets and differential   Result Value Ref Range    WBC Count 12.9 (H) 4.0 - 11.0 10e3/uL    RBC Count 4.29 3.80 - 5.20 10e6/uL    Hemoglobin 14.7 11.7 - 15.7 g/dL    Hematocrit 43.8 35.0 - 47.0 %     (H) 78 - 100 fL    MCH 34.3 (H) 26.5 - 33.0 pg    MCHC 33.6 31.5 - 36.5 g/dL    RDW 15.0 10.0 - 15.0 %    Platelet Count 220 150 - 450 10e3/uL    % Neutrophils 90 %    % Lymphocytes 5 %    % Monocytes 4 %    % Eosinophils 0 %    % Basophils 0 %    % Immature Granulocytes 1 %    NRBCs per 100 WBC 0 <1 /100    Absolute Neutrophils 11.6 (H) 1.6 - 8.3 10e3/uL    Absolute Lymphocytes 0.6 (L) 0.8 - 5.3 10e3/uL    Absolute Monocytes 0.5 0.0 - 1.3 10e3/uL    Absolute Eosinophils 0.0 0.0 - 0.7 10e3/uL    Absolute Basophils 0.0 0.0 - 0.2 10e3/uL    Absolute Immature Granulocytes 0.1 <=0.4 10e3/uL    Absolute NRBCs 0.0 10e3/uL   Extra Blood Culture Bottle   Result Value Ref Range    Hold Specimen JI    Extra Red Top Tube   Result Value Ref Range     Hold Specimen JIC    Extra Tube ([place] Draw)    Narrative    The following orders were created for panel order Extra Tube ([place] Draw).  Procedure                               Abnormality         Status                     ---------                               -----------         ------                     Extra Blood Bank Purple ...[[id number]]                      Final result               Extra Blood Bank Purple ...[[id number]]                      Final result                 Please view results for these tests on the individual orders.   Extra Blood Bank Purple Top Tube   Result Value Ref Range    Hold Specimen JIC    Extra Blood Bank Purple Top Tube   Result Value Ref Range    Hold Specimen JIC    iStat Basic Chem ICA Hematocrit, POCT   Result Value Ref Range    Chloride POCT 101 94 - 109 mmol/L    Potassium POCT 4.2 3.4 - 5.3 mmol/L    Sodium POCT 140 135 - 145 mmol/L    UREA NITROGEN POCT 19 7 - 30 mg/dL    Calcium, Ionized Whole Blood POCT 4.9 4.4 - 5.2 mg/dL    Glucose Whole Blood POCT 183 (H) 70 - 99 mg/dL    Anion Gap POCT 18.0 (H) 3.0 - 14.0 mmol/L    Hemoglobin POCT 15.6 11.7 - 15.7 g/dL    Hematocrit POCT 46 35 - 47 %    Creatinine POCT 0.9 0.5 - 1.0 mg/dL    TOTAL CO2 POCT 26 20 - 32 mmol/L   iStat Gases (lactate) venous, POCT   Result Value Ref Range    Lactic Acid POCT 1.9 <=2.0 mmol/L    Bicarbonate Venous POCT 27 21 - 28 mmol/L    O2 Sat, Venous POCT 88 (H) 70 - 75 %    pCO2 Venous POCT 37 (L) 40 - 50 mm Hg    pH Venous POCT 7.47 (H) 7.32 - 7.43    pO2 Venous POCT 51 (H) 25 - 47 mm Hg   CT Head w/o Contrast   Result Value Ref Range    Radiologist flags intracranial hemorrhage (Urgent)     Narrative    CT SCAN OF THE HEAD WITHOUT CONTRAST   2/29/2024 7:59 AM     HISTORY: Code Stroke. Evaluate for potential thrombolysis and  thrombectomy.    TECHNIQUE:  Axial images of the head and coronal reformations without  IV contrast material. Radiation dose for this scan was reduced using  automated  exposure control, adjustment of the mA and/or kV according  to patient size, or iterative reconstruction technique.    COMPARISON: 1/8/2024.    FINDINGS: There is a large parenchymal hemorrhage centered about the  left occipital lobe. Intraventricular extension into the left lateral  ventricle, third ventricle, and fourth ventricle. It is difficult to  differentiate the exact dimensions of the parenchymal component  separate from the intraventricular component, although the occipital  lobe component appears to measure approximately 5.8 x 4.2 x 3.8 cm.  Multiple regions of hypodense blood are present within the hematoma  (swirl sign), suggestive of active/ongoing bleeding. There is  surrounding vasogenic edema resulting in sulcal effacement of the  right temporal, parietal, and occipital lobes, as well as partial  effacement of the left lateral ventricle. The left temporal horn is  dilated, suggestive of ventricular entrapment. Approximately 13 mm  rightward midline shift of the septum pellucidum. The basal cisterns  are patent, although there is minimal medialization of the left uncus.  Moderate presumed sequela of chronic microvascular ischemia. Mild to  moderate generalized parenchymal volume loss. No findings to suggest  an acute territorial infarction. No subdural collection.    Redemonstrated complete opacification of the left maxillary sinus.  There is partial osseous dehiscence of the left maxillary sinus floor  (series 6, image 15). Otherwise, trace scattered mucosal thickening.  No mastoid or middle ear effusion. The bony calvarium and bones of the  skull base appear intact.       Impression    IMPRESSION:     1. Large parenchymal hematoma centered about the left occipital lobe  with suspected active/ongoing bleeding.  2. Intraventricular extension into the left lateral, third, and fourth  ventricles. Dilatation of the left temporal horn suggests ventricular  entrapment.  3. Mass effect from the hematoma  results in sulcal effacement  throughout the posterior aspect of the left cerebral hemisphere, as  well as 13 mm leftward midline shift.    [Urgent Result: intracranial hemorrhage]    Finding was identified on 2/29/2024 8:00 AM.     Dr. Pimentel was contacted by me on 2/29/2024 8:15 AM and verbalized  understanding of the critical result.    LAURIE MUNSON MD         SYSTEM ID:  [id number]   EKG 12-lead, tracing only   Result Value Ref Range    Systolic Blood Pressure  mmHg    Diastolic Blood Pressure  mmHg    Ventricular Rate 86 BPM    Atrial Rate  BPM    LA Interval  ms    QRS Duration 84 ms     ms    QTc 469 ms    P Axis  degrees    R AXIS 42 degrees    T Axis 88 degrees    Interpretation ECG       Atrial fibrillation  Abnormal ECG  When compared with ECG of 08-JAN-2024 19:02,  Vent. rate has increased BY  28 BPM  QT has lengthened  Unconfirmed report - interpretation of this ECG is computer generated - see medical record for final interpretation  Confirmed by - EMERGENCY ROOM, PHYSICIAN ([id number]),  DEANN ALLEN ([id number]) on 2/29/2024 8:59:39 AM         Medical Decision Making     80 MINUTES SPENT BY ME on the date of service doing chart review, history, exam, documentation & further activities per the note.

## 2024-02-29 NOTE — CONSULTS
[facility]    Stroke Telephone Note    I was called by Emanuel Pimentel on 02/29/24 regarding patient Leola Bui. The patient is a 88 year old female with PMH of parkinson's, dementia (lives in memory care unit), atrial fibrillation on Eliquis. She presents for emesis, unresponsiveness. LKW reported to be around 0400 when she had onset of dark vomiting (concern for blood) and then became unresponsive. Per ED provider on exam patient will grimace to sternal rub, has tremor of BUE, no movement in BLE, dried blood around mouth. /130.     Per discussion with ED provider patient is DNR/DNI; active management is not anticipated but ED to reach out to family to discuss.     Vitals      Pulse: 91   Resp: 23            Stroke Code Data (for stroke code without tele)  Stroke code activated 02/29/24  0746   Stroke provider first response 02/29/24  0752   Last known normal 02/29/24  0400      Time of discovery (or onset of symptoms) 02/29/24  0400   Head CT read by Stroke Neuro Provider 02/29/24  0800   Was stroke code de-escalated? No           Imaging Findings  CT head: large L occipital parenchymal hematoma with intraventricular extension; 13mm leftward MLS  CTA head/neck: canceled per ED    Intravenous Thrombolysis  Not given due to:   - active bleeding    Endovascular Treatment  Not initiated due to absence of proximal vessel occlusion    Impression  Acute hemorrhage in patient on Eliquis     Recommendations   If active intervention desired:  -reverse Eliquis  -start nicardipine with initial BP goal 130-150  -transfer to higher level of care with on-site neurosurgery  -contact neurosurgery for additional recommendations    Case discussed with vascular neurology attending Dr. Grijalva .    My recommendations are based on the information provided over the phone by Leola Bui's in-person providers. They are not intended to replace the clinical judgment of her in-person providers. I was not  "requested to personally see or examine the patient at this time.     DALE Cruz CNP  Vascular Neurology    To page me or covering stroke neurology team member, click here: AMCOM  Choose \"On Call\" tab at top, then select \"NEUROLOGY/ALL SITES\" from middle drop-down box, press Enter, then look for \"stroke\" or \"telestroke\" for your site.   "

## 2024-02-29 NOTE — ED NOTES
[facility]  ED Nurse Handoff Report    ED Chief complaint: Altered Mental Status  . ED Diagnosis:   Final diagnoses:   Intraparenchymal hemorrhage of brain (H)       Allergies:   Allergies   Allergen Reactions    Erythromycin Hives and Rash    Penicillins Hives, Itching and Rash    Sulfa Antibiotics Hives and Rash    Amlodipine      Other reaction(s): *Unknown    Carvedilol      Other reaction(s): Syncope    Clonidine      Other reaction(s): *Unknown    Diltiazem      Other reaction(s): *Unknown    Flagyl [Metronidazole]     Hydrochlorothiazide      Other reaction(s): *Unknown    Lisinopril        Code Status: DNR / DNI    Activity level - Baseline/Home:  assist of 1.  Activity Level - Current:   in bed.   Lift room needed: No.   Bariatric: No   Needed: No   Isolation: No.   Infection: Not Applicable.     Respiratory status: Room air    Vital Signs (within 30 minutes):   Vitals:    02/29/24 0744   Pulse: 91   Resp: 23   SpO2: 97%       Cardiac Rhythm:  ,      Pain level:    Patient confused: Yes.   Patient Falls Risk: arm band in place.   Elimination Status: Has voided     Patient Report - Initial Complaint: Patient arrives via EMS from memory care with altered mental status and dark emesis.   Focused Assessment: Patient arrived from her memory care who report patient woke at 4am and had an episode of dark emesis.  Staff found patient this morning with altered mental status and only responsive to pain.       Abnormal Results:   Labs Ordered and Resulted from Time of ED Arrival to Time of ED Departure   BASIC METABOLIC PANEL - Abnormal       Result Value    Sodium 136      Potassium 4.2      Chloride 98      Carbon Dioxide (CO2) 23      Anion Gap 15      Urea Nitrogen 18.3      Creatinine 0.84      GFR Estimate 66      Calcium 9.4      Glucose 176 (*)    INR - Abnormal    INR 1.47 (*)    TROPONIN T, HIGH SENSITIVITY - Abnormal    Troponin T, High Sensitivity 18 (*)    CBC WITH  PLATELETS AND DIFFERENTIAL - Abnormal    WBC Count 12.9 (*)     RBC Count 4.29      Hemoglobin 14.7      Hematocrit 43.8       (*)     MCH 34.3 (*)     MCHC 33.6      RDW 15.0      Platelet Count 220      % Neutrophils 90      % Lymphocytes 5      % Monocytes 4      % Eosinophils 0      % Basophils 0      % Immature Granulocytes 1      NRBCs per 100 WBC 0      Absolute Neutrophils 11.6 (*)     Absolute Lymphocytes 0.6 (*)     Absolute Monocytes 0.5      Absolute Eosinophils 0.0      Absolute Basophils 0.0      Absolute Immature Granulocytes 0.1      Absolute NRBCs 0.0     ISTAT BASIC CHEM ICA HEMATOCRIT POCT - Abnormal    Chloride POCT 101      Potassium POCT 4.2      Sodium POCT 140      UREA NITROGEN POCT 19      Calcium, Ionized Whole Blood POCT 4.9      Glucose Whole Blood POCT 183 (*)     Anion Gap POCT 18.0 (*)     Hemoglobin POCT 15.6      Hematocrit POCT 46      Creatinine POCT 0.9      TOTAL CO2 POCT 26     ISTAT GASES LACTATE VENOUS POCT - Abnormal    Lactic Acid POCT 1.9      Bicarbonate Venous POCT 27      O2 Sat, Venous POCT 88 (*)     pCO2 Venous POCT 37 (*)     pH Venous POCT 7.47 (*)     pO2 Venous POCT 51 (*)    PARTIAL THROMBOPLASTIN TIME - Normal    aPTT 34     GLUCOSE MONITOR NURSING POCT        CT Head w/o Contrast   Final Result   Abnormal   IMPRESSION:      1. Large parenchymal hematoma centered about the left occipital lobe   with suspected active/ongoing bleeding.   2. Intraventricular extension into the left lateral, third, and fourth   ventricles. Dilatation of the left temporal horn suggests ventricular   entrapment.   3. Mass effect from the hematoma results in sulcal effacement   throughout the posterior aspect of the left cerebral hemisphere, as   well as 13 mm leftward midline shift.      [Urgent Result: intracranial hemorrhage]      Finding was identified on 2/29/2024 8:00 AM.       Dr. Pimentel was contacted by me on 2/29/2024 8:15 AM and verbalized   understanding of the  critical result.      LAURIE MUNSON MD            SYSTEM ID:  [id number]          Treatments provided: None  Family Comments: Daughter on her way   OBS brochure/video discussed/provided to patient:  N/A  ED Medications:   Medications   morphine (PF) injection 1 mg (has no administration in time range)   morphine (PF) injection 2 mg (has no administration in time range)   ondansetron (ZOFRAN ODT) ODT tab 4 mg (has no administration in time range)     Or   ondansetron (ZOFRAN) injection 4 mg (has no administration in time range)   glycopyrrolate (ROBINUL) injection 0.2 mg (has no administration in time range)   iopamidol (ISOVUE-370) solution 500 mL (500 mLs Intravenous Not Given 2/29/24 0802)   CT Scan Flush (100 mLs Intravenous Not Given 2/29/24 0801)       Drips infusing:  No  For the majority of the shift this patient was Green.   Interventions performed were None.    Sepsis treatment initiated: No    Cares/treatment/interventions/medications to be completed following ED care: None    ED Nurse Name: Laura Moe RN  8:42 AM     RECEIVING UNIT ED HANDOFF REVIEW    Above ED Nurse Handoff Report was reviewed: Yes  Reviewed by: Evelia Barry RN on February 29, 2024 at 4:42 PM   PAM Worthy called the ED to inform them the note was read: Yes

## 2024-02-29 NOTE — ED TRIAGE NOTES
Patient arrives via EMS from [facility]. Patient vomited around 4am and it was reported to be dark in color. Patient was found to be altered this morning and only responsive to painful stimuli.  Patient is normal alert and ambulatory but has a hx or dementia.      Triage Assessment (Adult)       Row Name 02/29/24 0742          Triage Assessment    Airway WDL WDL        Respiratory WDL    Respiratory WDL WDL        Skin Circulation/Temperature WDL    Skin Circulation/Temperature WDL WDL        Cardiac WDL    Cardiac WDL WDL        Peripheral/Neurovascular WDL    Peripheral Neurovascular WDL WDL        Cognitive/Neuro/Behavioral WDL    Cognitive/Neuro/Behavioral WDL WDL

## 2024-02-29 NOTE — PHARMACY-ADMISSION MEDICATION HISTORY
Pharmacist Admission Medication History    Admission medication history is complete. The information provided in this note is only as accurate as the sources available at the time of the update.    Information Source(s): Facility (U/NH/) medication list/MAR via N/A    Pertinent Information:      Changes made to PTA medication list:  Added: mirtazapine  Deleted: trazodone  Changed: losartan, potassium, calmoseptine oint    Allergies reviewed with patient and updates made in EHR: unable to assess    Medication History Completed By: Ritika Higgins [facility] 2/29/2024 1:51 PM    Prior to Admission medications    Medication Sig Last Dose Taking? Auth Provider Long Term End Date   acetaminophen (TYLENOL) 500 MG tablet Take 1,000 mg by mouth 3 times daily 2/29/2024 at am Yes Unknown, Entered By History     allopurinol (ZYLOPRIM) 100 MG tablet Take 100 mg by mouth at bedtime 2/28/2024 at hs Yes Unknown, Entered By History     apixaban ANTICOAGULANT (ELIQUIS) 5 MG tablet Take 5 mg by mouth 2 times daily 2/29/2024 at am Yes Unknown, Entered By History     Artificial Saliva (BIOTENE DRY MOUTH) LOZG Take 1 lozenge by mouth 4 times daily as needed (dry mouth)  Yes Unknown, Entered By History     benzocaine (ORAJEL MAXIMUM STRENGTH) 20 % GEL gel Take by mouth 4 times daily as needed for mouth sores (apply pea-size amount to painful teeth/gums)  Yes Unknown, Entered By History     calcium carbonate (TUMS) 500 MG chewable tablet Take 1 chew tab by mouth every 6 hours as needed for heartburn  Yes Unknown, Entered By History     carbidopa-levodopa (SINEMET CR)  MG CR tablet Take 1 tablet by mouth at bedtime 2/28/2024 at hs Yes Unknown, Entered By History No    carbidopa-levodopa (SINEMET)  MG tablet Take 1 tablet by mouth 3 times daily 2/29/2024 at am Yes Unknown, Entered By History No    carbidopa-levodopa (SINEMET)  MG tablet Take 0.5 tablets by mouth daily as needed (Parkinson's disease)  Yes Unknown, Entered By  History No    carbidopa-levodopa (SINEMET)  MG tablet Take 0.5 tablets by mouth at bedtime 2/28/2024 at hs Yes Unknown, Entered By History No    cetirizine (ZYRTEC) 5 MG tablet Take 5 mg by mouth daily 2/29/2024 at am Yes Unknown, Entered By History     Cranberry 250 MG CAPS Take 500 mg by mouth daily 2/29/2024 at am Yes Unknown, Entered By History     diclofenac (VOLTAREN) 1 % topical gel Apply 4 g topically 3 times daily 2/29/2024 at am Yes Unknown, Entered By History     doxepin (SILENOR) 3 MG tablet Take 3 mg by mouth at bedtime 2/28/2024 at hs Yes Unknown, Entered By History     fluticasone (FLONASE) 50 MCG/ACT nasal spray Spray 1 spray into both nostrils daily 2/29/2024 at am Yes Unknown, Entered By History     furosemide (LASIX) 40 MG tablet Take 40 mg by mouth daily 2/29/2024 at am Yes Unknown, Entered By History No    gabapentin (NEURONTIN) 100 MG capsule Take 100 mg by mouth 3 times daily 2/29/2024 at am Yes Unknown, Entered By History Yes    hydrocortisone (PREPARATION H) 1 % external cream Apply topically daily as needed  Yes Unknown, Entered By History     hydrOXYzine HCl (ATARAX) 25 MG tablet Take 25 mg by mouth every 8 hours as needed for anxiety  Yes Unknown, Entered By History     Lactobacillus (ACIDOPHILUS PO) Take 1 capsule by mouth daily 250 million 2/29/2024 at am Yes Unknown, Entered By History     lidocaine (LMX4) 4 % external cream Apply topically 3 times daily Apply pea sized amount to back three times daily for low back pain 2/29/2024 at am Yes Unknown, Entered By History     loperamide (IMODIUM) 2 MG capsule Take 2 mg by mouth 2 times daily as needed for diarrhea  Yes Unknown, Entered By History     losartan (COZAAR) 50 MG tablet Take 50 mg by mouth every morning 2/29/2024 at am Yes Unknown, Entered By History No    losartan (COZAAR) 50 MG tablet Take 25 mg by mouth every evening 2/28/2024 at pm Yes Unknown, Entered By History No    menthol-zinc oxide (CALMOSEPTINE) 0.44-20.6 % OINT  ointment Apply topically 2 times daily 2/29/2024 at am Yes Unknown, Entered By History     menthol-zinc oxide (CALMOSEPTINE) 0.44-20.6 % OINT ointment Apply topically as needed for skin protection  Yes Unknown, Entered By History     metoprolol succinate ER (TOPROL XL) 50 MG 24 hr tablet Take 50 mg by mouth 2 times daily 2/29/2024 at am Yes Unknown, Entered By History No    mirtazapine (REMERON) 7.5 MG tablet Take 7.5 mg by mouth at bedtime 2/28/2024 at hs Yes Unknown, Entered By History Yes    nystatin (MYCOSTATIN) 519089 UNIT/GM external powder Apply topically 2 times daily as needed (under  right breast)  Yes Unknown, Entered By History     oxyBUTYnin (DITROPAN) 5 MG tablet Take 5 mg by mouth at bedtime 2/28/2024 at hs Yes Unknown, Entered By History     polyethylene glycol (MIRALAX) 17 g packet Take 8.5 g by mouth every other day 2/28/2024 at am Yes Unknown, Entered By History     polyethylene glycol-propylene glycol (SYSTANE ULTRA) 0.4-0.3 % SOLN ophthalmic solution Place 1 drop into both eyes every 4 hours as needed for dry eyes  Yes Unknown, Entered By History     potassium chloride (KLOR-CON) 20 MEQ packet Take 20 mEq by mouth 2 times daily 2/29/2024 at am Yes Unknown, Entered By History     pramipexole (MIRAPEX) 0.125 MG tablet Take 0.125 mg by mouth every evening 2/28/2024 at hs Yes Unknown, Entered By History No    pramox-pe-glycerin-petrolatum (PREPARATION H) 1-0.25-14.4-15 % CREA cream Place rectally 4 times daily as needed for hemorrhoids  Yes Unknown, Entered By History     sertraline (ZOLOFT) 100 MG tablet Take 100 mg by mouth daily 2/29/2024 at am Yes Unknown, Entered By History No    simethicone (MYLICON) 125 MG chewable tablet Take 125 mg by mouth 4 times daily as needed for intestinal gas  Yes Unknown, Entered By History     traMADol (ULTRAM) 50 MG tablet Take 25 mg by mouth 3 times daily 2/29/2024 at 1400 Yes Unknown, Entered By History     vitamin D3 (CHOLECALCIFEROL) 50 mcg (2000 units) tablet  Take 50 mcg by mouth daily 2/29/2024 at am Yes Unknown, Entered By History

## 2024-02-29 NOTE — ED PROVIDER NOTES
History     Chief Complaint:  Altered Mental Status       HPI   Leola Bui is a 88 year old female presenting from a memory care facility after bloody emesis.  Per EMS, last known normal was 4 AM.  Her baseline is walking and talking.  When they arrived, she was nonresponsive.  She was hypertensive.  She is on Eliquis for A-fib.  No reported fall.  The patient is unable to provide any other history at this time, and no family is present at time of arrival.       Independent Historian:    EMS - see above    Review of External Notes:  1/10/24: Discharge summary reviewed.  Patient was hospitalized for failure to thrive.    Medications:    No current outpatient medications on file.      Past Medical History:    Past Medical History:   Diagnosis Date    Acute diastolic congestive heart failure (H)     Anxiety     Breast cancer (H)     CKD (chronic kidney disease) stage 3, GFR 30-59 ml/min (H)     Essential hypertension     Gout     Osteoporosis     Parkinson disease (H)     Parkinsons disease (H)        Past Surgical History:    Past Surgical History:   Procedure Laterality Date    MASTECTOMY      PHACOEMULSIFICATION CLEAR CORNEA WITH STANDARD INTRAOCULAR LENS IMPLANT Left 9/19/2022    Procedure: PHACOEMULSIFICATION, CATARACT, WITH INTRAOCULAR LENS IMPLANT LEFT;  Surgeon: Contreras Feldman MD;  Location: [facility] OR    PHACOEMULSIFICATION CLEAR CORNEA WITH STANDARD INTRAOCULAR LENS IMPLANT Right 12/26/2022    Procedure: PHACOEMULSIFICATION, CATARACT, WITH INTRAOCULAR LENS IMPLANT RIGHT;  Surgeon: Contreras Feldman MD;  Location: [facility] OR          Physical Exam   Patient Vitals for the past 24 hrs:   Pulse Resp SpO2   02/29/24 1700 -- -- 96 %   02/29/24 1500 -- -- 96 %   02/29/24 1300 -- -- 95 %   02/29/24 0744 91 23 97 %        Physical Exam  General: Resting on the bed.  Head: No obvious trauma to head.  Ears, Nose, Throat:  External ears normal.  Nose normal.  Dry mucous membranes and dried blood in the  oropharynx.  No signs of tongue laceration  Eyes:  Conjunctivae clear.  Neck: Normal range of motion.  Neck supple.   CV: Regular rate and rhythm.  No murmurs.      Respiratory: Effort normal and breath sounds normal.  No wheezing or crackles.   Gastrointestinal: Soft.  No distension. There is no tenderness.  There is no rigidity, no rebound and no guarding.   Musculoskeletal: Normal range of motion.  Non tender extremities to palpations.    Neuro: Eyes closed at baseline.  Not following commands.  Grimaces to sternal rub.  Resting tremors in the bilateral upper extremities.  Spontaneously moving the left upper extremity.  Right upper extremity and bilateral lower extremities do not move to noxious stimuli.  Skin: Skin is warm and dry.  No rash noted.   Psych: Unable to assess      Emergency Department Course   ECG    ECG results from 02/29/24   EKG 12-lead, tracing only     Value    Systolic Blood Pressure     Diastolic Blood Pressure     Ventricular Rate 86    Atrial Rate     SC Interval     QRS Duration 84        QTc 469    P Axis     R AXIS 42    T Axis 88    Interpretation ECG      Atrial fibrillation  When compared with ECG of 08-JAN-2024 19:02,  Vent. rate has increased BY  28 BPM  QT has lengthened           Imaging:  CT Head w/o Contrast   Final Result   Abnormal   IMPRESSION:      1. Large parenchymal hematoma centered about the left occipital lobe   with suspected active/ongoing bleeding.   2. Intraventricular extension into the left lateral, third, and fourth   ventricles. Dilatation of the left temporal horn suggests ventricular   entrapment.   3. Mass effect from the hematoma results in sulcal effacement   throughout the posterior aspect of the left cerebral hemisphere, as   well as 13 mm leftward midline shift.      [Urgent Result: intracranial hemorrhage]      Finding was identified on 2/29/2024 8:00 AM.       Dr. Pimentel was contacted by me on 2/29/2024 8:15 AM and verbalized   understanding of  the critical result.      LAURIE MUNSON MD            SYSTEM ID:  [id number]          Laboratory:  Labs Ordered and Resulted from Time of ED Arrival to Time of ED Departure   BASIC METABOLIC PANEL - Abnormal       Result Value    Sodium 136      Potassium 4.2      Chloride 98      Carbon Dioxide (CO2) 23      Anion Gap 15      Urea Nitrogen 18.3      Creatinine 0.84      GFR Estimate 66      Calcium 9.4      Glucose 176 (*)    INR - Abnormal    INR 1.47 (*)    TROPONIN T, HIGH SENSITIVITY - Abnormal    Troponin T, High Sensitivity 18 (*)    CBC WITH PLATELETS AND DIFFERENTIAL - Abnormal    WBC Count 12.9 (*)     RBC Count 4.29      Hemoglobin 14.7      Hematocrit 43.8       (*)     MCH 34.3 (*)     MCHC 33.6      RDW 15.0      Platelet Count 220      % Neutrophils 90      % Lymphocytes 5      % Monocytes 4      % Eosinophils 0      % Basophils 0      % Immature Granulocytes 1      NRBCs per 100 WBC 0      Absolute Neutrophils 11.6 (*)     Absolute Lymphocytes 0.6 (*)     Absolute Monocytes 0.5      Absolute Eosinophils 0.0      Absolute Basophils 0.0      Absolute Immature Granulocytes 0.1      Absolute NRBCs 0.0     ISTAT BASIC CHEM ICA HEMATOCRIT POCT - Abnormal    Chloride POCT 101      Potassium POCT 4.2      Sodium POCT 140      UREA NITROGEN POCT 19      Calcium, Ionized Whole Blood POCT 4.9      Glucose Whole Blood POCT 183 (*)     Anion Gap POCT 18.0 (*)     Hemoglobin POCT 15.6      Hematocrit POCT 46      Creatinine POCT 0.9      TOTAL CO2 POCT 26     ISTAT GASES LACTATE VENOUS POCT - Abnormal    Lactic Acid POCT 1.9      Bicarbonate Venous POCT 27      O2 Sat, Venous POCT 88 (*)     pCO2 Venous POCT 37 (*)     pH Venous POCT 7.47 (*)     pO2 Venous POCT 51 (*)    PARTIAL THROMBOPLASTIN TIME - Normal    aPTT 34     GLUCOSE MONITOR NURSING POCT        Procedures   NA    Emergency Department Course & Assessments:    Interventions:  Medications   morphine (PF) injection 1 mg (1 mg Intravenous Not  Given 2/29/24 1657)   morphine (PF) injection 2 mg (2 mg Intravenous $Given 2/29/24 1348)   ondansetron (ZOFRAN ODT) ODT tab 4 mg (has no administration in time range)     Or   ondansetron (ZOFRAN) injection 4 mg (has no administration in time range)   glycopyrrolate (ROBINUL) injection 0.2 mg (has no administration in time range)   lidocaine 1 % 0.1-1 mL (has no administration in time range)   lidocaine (LMX4) cream (has no administration in time range)   sodium chloride (PF) 0.9% PF flush 3 mL (3 mLs Intracatheter $Given 2/29/24 1050)   sodium chloride (PF) 0.9% PF flush 3 mL (has no administration in time range)   Contraindications to both pharmacological and mechanical prophylaxis (must document contraindications for both in this order) (has no administration in time range)   ondansetron (ZOFRAN) injection 4 mg (has no administration in time range)   acetaminophen (TYLENOL) Suppository 650 mg (has no administration in time range)   LORazepam (ATIVAN) injection 1 mg (has no administration in time range)     Or   LORazepam (ATIVAN) tablet 1 mg (has no administration in time range)   haloperidol lactate (HALDOL) injection 1 mg (has no administration in time range)   atropine 1 % ophthalmic solution 2 drop (has no administration in time range)   bisacodyl (DULCOLAX) suppository 10 mg (has no administration in time range)   glycopyrrolate (ROBINUL) injection 0.2 mg (has no administration in time range)   diazepam (VALIUM) injection 2.5 mg (has no administration in time range)   LORazepam (ATIVAN) injection 0.5 mg (0.5 mg Intravenous $Given 2/29/24 1658)   morphine (PF) injection 1 mg (1 mg Intravenous $Given 2/29/24 1659)   iopamidol (ISOVUE-370) solution 500 mL (500 mLs Intravenous Not Given 2/29/24 0802)   CT Scan Flush (100 mLs Intravenous Not Given 2/29/24 0801)        Assessments:  0741 - Initial evaluation and assessment of patient  0805 - I spoke to the patient's daughters, Sonja and Bridget  0820 - I reevaluated  the patient      Independent Interpretation (X-rays, CTs, rhythm strip):  Head CT reveals intraparenchymal hematoma with intraventricular extension    Consultations/Discussion of Management or Tests:  0753 - I spoke to stroke neuro  0834 - I spoke Nissa Abarca PA-C, hospitalist      Social Determinants of Health affecting care:  None     Disposition:  The patient was admitted to the hospital under the care of Dr. Justin.    Impression & Plan    CMS Diagnoses: None      Medical Decision Making:  Patient presents with bloody emesis.  She does not appear to be at baseline, is minimally responsive.  She is hemodynamically stable.  Lactate is not elevated.  She is not anemic.  Given her acute change in mental status, a code stroke is called.  Head CT reveals a large intraparenchymal hematoma with intraventricular hemorrhage and midline shift.  I had a discussion with the patient's 2 daughters and made them aware of the patient's current clinical status.  They reiterate that the patient is DNR/DNI, and that she would not want any extensive surgical intervention.  I informed them that I was concerned about the patient, and this is not a survivable brain bleed.  They verbalized understanding, and would like to continue with comfort cares.  Comfort care order set is placed.  I discussed the patient with the hospitalist, who is in agreement to admit the patient for continued comfort cares, and they will consult palliative care.  She remains in stable condition and is transferred to the floor.        Diagnosis:    ICD-10-CM    1. Intraparenchymal hemorrhage of brain (H)  I61.9            Discharge Medications:  Current Discharge Medication List             2/29/2024   Emanuel Pimentel MD Peery, Stephen, MD  02/29/24 1818

## 2024-02-29 NOTE — PROGRESS NOTES
[facility]    ED Boarding Nurse Handoff Addendum Report:    Date/time: 2/29/2024, 9:25 AM    Activity Level: in bed    Fall Risk: Yes:  bed/chair alarm on, arm band in place, patient and family education, and activity supervised    Active Infusions: none    Current Meds Due: See MAR for PRN's    Current care needs: Palliative care    Oxygen requirements (liters/min and/or FiO2):none    Respiratory status: Room air    Vital signs (within last 30 minutes):    Vitals:    02/29/24 0744   Pulse: 91   Resp: 23   SpO2: 97%       Focused assessment within last 30 minutes:    Patient is non responsive. Slight movements with left hand and left toes. Sluggish pupil response, pupils uneven. Has purewick in place. Vitally stable. No emesis since boarding. Patient does not appear to be in pain and appears comfortable. Frequent mouth cares and repositioning. Comfort measures taken.   Seizure pads on rails. Morphine IV given for pain  Family at bedside     ED Boarding Nurse name: Randi Barahona RN

## 2024-02-29 NOTE — PROGRESS NOTES
"[facility] Progress Note  ED    Met with patient, daughter; and family friend regarding on call page for emotional and spiritual support.    Patient was admitted from [facility] due to a brain hemorrhage and other hospital problems.  She was not alert at time of visit.    Patient is Sabianist.  She does not currently have a home [employer].  Daughter stated that there is no community clergy to contact on their behalf.    Family friend read to patient out of a spiritual book.     Daughter said patient has been living with advanced Parkinson's disease and lost her  last fall.  She added, \"Mom has been ready to go since that time.\"    Daughter expressed appreciation for visit.  They are awaiting arrival of another daughter from [place].    No additional needs.  [facility] remains available upon request.    Rev. Macarena Patel M.Div.  Staff   Phone  [phone]    "

## 2024-02-29 NOTE — H&P
----- Message from Jessa Baxter CMA sent at 6/17/2021 11:14 AM CDT -----    ----- Message -----  From: Mariely Joaquin MD  Sent: 6/17/2021  10:59 AM CDT  To: Jessa Baxter CMA    This is a follow-up patient which has been scheduled for 60-minute slot.  Please reschedule for 20-minute slot and have her FD open other slots.       [facility]  Internal Medicine  History and Physical      Patient Name: Leola Bui MRN# [id number]   Age: 88 year old YOB: 1935     Date of Admission:2/29/2024    Primary care provider: Kristopher Barahona  Date of Service: 2/29/2024         Assessment and Plan:   Leola Bui is a 88 year old female with a history of RLS, Parkinson's Disease, Dementia in Memory Care, HTN, Diastolic CHF, CKD, Chronic Atrial Fibrillation on Eliquis who presents to the ED today with emesis and unresponsiveness.    History obtained from chart review and ED providers.  Patient presents from her memory care facility where she was found with bloody emesis non responsive around 4am.  No reported fall.  ED work up revealed an intracranial hemorrhage on CT.  ED provider discussed with family and Neurosurgery.  Admission for comfort cares is requested.      Intracranial Hemorrhage on Eliquis  Pt presented from [facility] care with dark colored emesis and unresponsiveness.  CT head revealed a large parenchymal hematoma centered about the left occipital lobe with suspected active/ongoing bleeding.  Intraventricular extension into the left lateral, third, and fourth ventricles. Dilatation of the left temporal horn suggests ventricular entrapment.  Mass effect from the hematoma results in sulcal effacement throughout the posterior aspect of the left cerebral hemisphere, as well as 13 mm leftward midline shift.  - Neurosurgery consulted, ED provider discussed with family and comfort based treatment was decided.  I called the patient's daughter, Bridget Bui who confirms these wishes  - comfort care orderset  - Palliative Care consult    Parkinson's Disease with Advanced Dementia      CODE: DNR/DNI  Diet/IVF: npo    Nissa Abarca MS PA-C  Physician Assistant   Hospitalist Service           Chief Complaint:   emesis and unresponsiveness         HPI:   88 year old female with a history of RLS, Parkinson's Disease, Dementia in  Memory Care, HTN, Diastolic CHF, CKD, Chronic Atrial Fibrillation on Eliquis who presents to the ED today with emesis and unresponsiveness.    History obtained from chart review and ED providers.  Patient presents from her memory care facility where she was found with bloody emesis non responsive around 4am.  No reported fall.  ED work up revealed an intracranial hemorrhage on CT.  ED provider discussed with family and Neurosurgery.  Admission for comfort cares is requested.           Past Medical History:     Past Medical History:   Diagnosis Date    Acute diastolic congestive heart failure (H)     Anxiety     Breast cancer (H)     CKD (chronic kidney disease) stage 3, GFR 30-59 ml/min (H)     Essential hypertension     Gout     Osteoporosis     Parkinson disease (H)     Parkinsons disease (H)           Past Surgical History:     Past Surgical History:   Procedure Laterality Date    MASTECTOMY      PHACOEMULSIFICATION CLEAR CORNEA WITH STANDARD INTRAOCULAR LENS IMPLANT Left 9/19/2022    Procedure: PHACOEMULSIFICATION, CATARACT, WITH INTRAOCULAR LENS IMPLANT LEFT;  Surgeon: Contreras Feldman MD;  Location: [facility] OR    PHACOEMULSIFICATION CLEAR CORNEA WITH STANDARD INTRAOCULAR LENS IMPLANT Right 12/26/2022    Procedure: PHACOEMULSIFICATION, CATARACT, WITH INTRAOCULAR LENS IMPLANT RIGHT;  Surgeon: Contreras Feldman MD;  Location: [facility] OR          Social History:     Social History     Socioeconomic History    Marital status:      Spouse name: Not on file    Number of children: Not on file    Years of education: Not on file    Highest education level: Not on file   Occupational History    Not on file   Tobacco Use    Smoking status: Never    Smokeless tobacco: Never   Substance and Sexual Activity    Alcohol use: Not Currently    Drug use: Not Currently    Sexual activity: Not on file   Other Topics Concern    Not on file   Social History Narrative    Not on file     Social Determinants of Health      Financial Resource Strain: Not on file   Food Insecurity: Not on file   Transportation Needs: Not on file   Physical Activity: Not on file   Stress: Not on file   Social Connections: Not on file   Interpersonal Safety: Not on file   Housing Stability: Not on file          Family History:     Family History   Problem Relation Age of Onset    Glaucoma No family hx of     Macular Degeneration No family hx of           Allergies:      Allergies   Allergen Reactions    Erythromycin Hives and Rash    Penicillins Hives, Itching and Rash    Sulfa Antibiotics Hives and Rash    Amlodipine      Other reaction(s): *Unknown    Carvedilol      Other reaction(s): Syncope    Clonidine      Other reaction(s): *Unknown    Diltiazem      Other reaction(s): *Unknown    Flagyl [Metronidazole]     Hydrochlorothiazide      Other reaction(s): *Unknown    Lisinopril           Medications:     Prior to Admission medications    Medication Sig Last Dose Taking? Auth Provider Long Term End Date   acetaminophen (TYLENOL) 500 MG tablet Take 1,000 mg by mouth 3 times daily   Unknown, Entered By History     allopurinol (ZYLOPRIM) 100 MG tablet Take 100 mg by mouth at bedtime   Unknown, Entered By History     apixaban ANTICOAGULANT (ELIQUIS) 5 MG tablet Take 5 mg by mouth 2 times daily   Unknown, Entered By History     Artificial Saliva (BIOTENE DRY MOUTH) LOZG Take 1 lozenge by mouth 4 times daily as needed (dry mouth)   Unknown, Entered By History     benzocaine (ORAJEL MAXIMUM STRENGTH) 20 % GEL gel Take by mouth 4 times daily as needed for mouth sores (apply pea-size amount to painful teeth/gums)   Unknown, Entered By History     calcium carbonate (TUMS) 500 MG chewable tablet Take 1 chew tab by mouth every 6 hours as needed for heartburn   Unknown, Entered By History     carbidopa-levodopa (SINEMET CR)  MG CR tablet Take 1 tablet by mouth at bedtime   Unknown, Entered By History No    carbidopa-levodopa (SINEMET)  MG tablet Take  1 tablet by mouth 3 times daily   Unknown, Entered By History No    carbidopa-levodopa (SINEMET)  MG tablet Take 0.5 tablets by mouth daily as needed (Parkinson's disease)   Unknown, Entered By History No    carbidopa-levodopa (SINEMET)  MG tablet Take 0.5 tablets by mouth at bedtime   Unknown, Entered By History No    cetirizine (ZYRTEC) 5 MG tablet Take 5 mg by mouth daily   Unknown, Entered By History     Cranberry 250 MG CAPS Take 500 mg by mouth daily   Unknown, Entered By History     diclofenac (VOLTAREN) 1 % topical gel Apply 4 g topically 3 times daily   Unknown, Entered By History     doxepin (SILENOR) 3 MG tablet Take 3 mg by mouth at bedtime   Unknown, Entered By History     fluticasone (FLONASE) 50 MCG/ACT nasal spray Spray 1 spray into both nostrils daily   Unknown, Entered By History     furosemide (LASIX) 40 MG tablet Take 40 mg by mouth daily   Unknown, Entered By History No    gabapentin (NEURONTIN) 100 MG capsule Take 100 mg by mouth 3 times daily   Unknown, Entered By History Yes    hydrOXYzine HCl (ATARAX) 25 MG tablet Take 25 mg by mouth every 8 hours as needed for anxiety   Unknown, Entered By History     Lactobacillus (ACIDOPHILUS PO) Take 1 capsule by mouth daily 250 million   Unknown, Entered By History     lidocaine (LMX4) 4 % external cream Apply topically 3 times daily Apply pea sized amount to back three times daily for low back pain   Unknown, Entered By History     loperamide (IMODIUM) 2 MG capsule Take 2 mg by mouth 2 times daily as needed for diarrhea   Unknown, Entered By History     losartan (COZAAR) 25 MG tablet Take 25 mg by mouth every evening   Unknown, Entered By History No    losartan (COZAAR) 50 MG tablet Take 50 mg by mouth every morning   Unknown, Entered By History No    metoprolol succinate ER (TOPROL XL) 50 MG 24 hr tablet Take 50 mg by mouth 2 times daily   Unknown, Entered By History No    mirabegron (MYRBETRIQ) 25 MG 24 hr tablet Take 25 mg by mouth at  bedtime   Unknown, Entered By History     nystatin (MYCOSTATIN) 299008 UNIT/GM external powder Apply topically 2 times daily as needed (under  right breast)   Unknown, Entered By History     polyethylene glycol (MIRALAX) 17 g packet Take 8.5 g by mouth every other day   Unknown, Entered By History     polyethylene glycol-propylene glycol (SYSTANE ULTRA) 0.4-0.3 % SOLN ophthalmic solution Place 1 drop into both eyes every 4 hours as needed for dry eyes   Unknown, Entered By History     potassium chloride ER (MICRO-K) 10 MEQ CR capsule Take 20 mEq by mouth 2 times daily   Unknown, Entered By History     pramipexole (MIRAPEX) 0.125 MG tablet Take 0.125 mg by mouth every evening   Unknown, Entered By History No    pramox-pe-glycerin-petrolatum (PREPARATION H) 1-0.25-14.4-15 % CREA cream Place rectally 4 times daily as needed for hemorrhoids   Unknown, Entered By History     sertraline (ZOLOFT) 100 MG tablet Take 100 mg by mouth daily   Unknown, Entered By History No    simethicone (MYLICON) 125 MG chewable tablet Take 125 mg by mouth 4 times daily as needed for intestinal gas   Unknown, Entered By History     traMADol (ULTRAM) 50 MG tablet Take 25 mg by mouth 2 times daily as needed for pain   Unknown, Entered By History     traZODone (DESYREL) 50 MG tablet Take 50 mg by mouth at bedtime   Unknown, Entered By History     vitamin D3 (CHOLECALCIFEROL) 50 mcg (2000 units) tablet Take 50 mcg by mouth daily   Unknown, Entered By History            Review of Systems:   A complete ROS was performed and is negative other than what is stated in the HPI.       Physical Exam:   Pulse 91, resp. rate 23, SpO2 97%.  General: sitting up in bed, occasional twitching of the RUE noted, does not repond.  HEENT: AT/NC, right pupil reactive.  Left pupil dilated and minimally reactive/sluggish  Chest/Resp: clear to auscultation bilaterally, no crackles or wheezes  Heart/CV: regular rate and rhythm, no murmur  Abdomen/GI: nondistended. +BS.    Neuro: non responsive with occasional RUE twitching         Labs:   ROUTINE ICU LABS (Last four results)  CMP  Recent Labs   Lab 02/29/24  0752 02/29/24  0748    136   POTASSIUM 4.2 4.2   CHLORIDE 101 98   CO2  --  23   ANIONGAP  --  15   * 176*   BUN 19 18.3   CR 0.9 0.84   GFRESTIMATED  --  66   DOINE  --  9.4     CBC  Recent Labs   Lab 02/29/24  0752 02/29/24  0748   WBC  --  12.9*   RBC  --  4.29   HGB 15.6 14.7   HCT 46 43.8   MCV  --  102*   MCH  --  34.3*   MCHC  --  33.6   RDW  --  15.0   PLT  --  220     INR  Recent Labs   Lab 02/29/24  0748   INR 1.47*     Arterial Blood GasNo lab results found in last 7 days.       Imaging/Procedures:     Results for orders placed or performed during the hospital encounter of 02/29/24   CT Head w/o Contrast     Value    Radiologist flags intracranial hemorrhage (Urgent)    Narrative    CT SCAN OF THE HEAD WITHOUT CONTRAST   2/29/2024 7:59 AM     HISTORY: Code Stroke. Evaluate for potential thrombolysis and  thrombectomy.    TECHNIQUE:  Axial images of the head and coronal reformations without  IV contrast material. Radiation dose for this scan was reduced using  automated exposure control, adjustment of the mA and/or kV according  to patient size, or iterative reconstruction technique.    COMPARISON: 1/8/2024.    FINDINGS: There is a large parenchymal hemorrhage centered about the  left occipital lobe. Intraventricular extension into the left lateral  ventricle, third ventricle, and fourth ventricle. It is difficult to  differentiate the exact dimensions of the parenchymal component  separate from the intraventricular component, although the occipital  lobe component appears to measure approximately 5.8 x 4.2 x 3.8 cm.  Multiple regions of hypodense blood are present within the hematoma  (swirl sign), suggestive of active/ongoing bleeding. There is  surrounding vasogenic edema resulting in sulcal effacement of the  right temporal, parietal, and occipital  lobes, as well as partial  effacement of the left lateral ventricle. The left temporal horn is  dilated, suggestive of ventricular entrapment. Approximately 13 mm  rightward midline shift of the septum pellucidum. The basal cisterns  are patent, although there is minimal medialization of the left uncus.  Moderate presumed sequela of chronic microvascular ischemia. Mild to  moderate generalized parenchymal volume loss. No findings to suggest  an acute territorial infarction. No subdural collection.    Redemonstrated complete opacification of the left maxillary sinus.  There is partial osseous dehiscence of the left maxillary sinus floor  (series 6, image 15). Otherwise, trace scattered mucosal thickening.  No mastoid or middle ear effusion. The bony calvarium and bones of the  skull base appear intact.       Impression    IMPRESSION:     1. Large parenchymal hematoma centered about the left occipital lobe  with suspected active/ongoing bleeding.  2. Intraventricular extension into the left lateral, third, and fourth  ventricles. Dilatation of the left temporal horn suggests ventricular  entrapment.  3. Mass effect from the hematoma results in sulcal effacement  throughout the posterior aspect of the left cerebral hemisphere, as  well as 13 mm leftward midline shift.    [Urgent Result: intracranial hemorrhage]    Finding was identified on 2/29/2024 8:00 AM.     Dr. Pimentel was contacted by me on 2/29/2024 8:15 AM and verbalized  understanding of the critical result.    LAURIE MUNSON MD         SYSTEM ID:  [id number]

## 2024-02-29 NOTE — ED NOTES
Assisted with Pt. Ambulance triage Placed Pt. on monitor (5-lead continous ECG, pulse ox, BP cuff). Placed Pt. On [facility] for transport to radiology.

## 2024-03-01 NOTE — PROGRESS NOTES
Pt on comfort care for duration of shift. Never gained consciousness. Family at bedside for duration of day. Scheduled ativan and morphine given for comfort. Around 11 family said she was having longer periods of apneic breathing. I observed pt having long moments of apnea followed by a gasp. Had about 4-5 times of gasping before breathing ceased. Heart beat ceased shortly after. MD and charge nurse notified and all paperwork started. Spiritual services consulted and met with pts family.  All postmortem care done shortly after family left and pt was transported to the [facility] in the afternoon.

## 2024-03-01 NOTE — CONSULTS
Care Management Discharge Note    Discharge Date: 03/02/2024       Discharge Disposition:        Private pay costs discussed: Not applicable    Handoff Referral Completed: No    Additional Information:    Pt admitted with intraparenchymal hemorrhage of brain, noted to have unplanned readmission risk of 24%.  Per chart review, pt is on comfort cares, unsure if pt will survive this hospitalization. SW if available if discharge planning indicated.     Yenny Maxwell/ARUNA Banda, RITA  Inpatient Care Coordination  Emergency Room /Yenny Perera, RENESW

## 2024-03-01 NOTE — CONSULTS
SPIRITUAL HEALTH SERVICES - Consult Note  Med/Surg 3    Referral Source: [facility] Consult; EOL support    Provided EOL blessing/service at daughter's request. Supported family at bedside at time of pt's death.    Plan: [facility] remains available as needed.    Gabe Núñez M.Div.   Intern    [facility] available 24/7 for emergent requests/referrals, either by paging the on-call  or by entering an ASAP/STAT consult in [facility], which will also page the on-call .

## 2024-03-01 NOTE — PLAN OF CARE
Goal Outcome Evaluation:       Patient arrived to the unit around 515 pm. Patient is unresponsive and has only involuntary movements. Comfort cares now in place. Repositioned q2 hours. Seems comfortable and not in pain. Family to remain at bedside. Periods of heavy breathing with 20-30 seconds of apnea.

## 2024-03-01 NOTE — PLAN OF CARE
Goal Outcome Evaluation:      Plan of Care Reviewed With: family    Overall Patient Progress: no changeOverall Patient Progress: no change    Outcome Evaluation: pt unresponsive, scheduled ativan and morphine given, PRN robinul given one time during shift, family at bedside.

## 2024-03-01 NOTE — DEATH PRONOUNCEMENT
MD DEATH PRONOUNCEMENT    Called to pronounce Leola Bui dead.    Physical Exam: Unresponsive to noxious stimuli, Spontaneous respirations absent, Breath sounds absent, Carotid pulse absent, Heart sounds absent, and Pupillary light reflex absent    Patient was pronounced dead at 11:10 AM, 2024.    Principal Problem:    Intraparenchymal hemorrhage of brain (H)       Infectious disease present?: NO    Communicable disease present? (examples: HIV, chicken pox, TB, Ebola, CJD) :  NO    Multi-drug resistant organism present? (example: MRSA): NO    Please consider an autopsy if any of the following exist:  NO Unexpected or unexplained death during or following any dental, medical, or surgical diagnostic treatment procedures.   NO Death of mother at or up to seven days after delivery.     NO All  and pediatric deaths.     NO Death where the cause is sufficiently obscure to delay completion of the death certificate.   NO Deaths in which autopsy would confirm a suspected illness/condition that would affect surviving family members or recipients of transplanted organs.     The following deaths must be reported to the 's Office:  NO A death that may be due entirely or in part to any factors other than natural disease (recent surgery, recent trauma, suspected abuse/neglect).   NO A death that may be an accident, suicide, or homicide.     NO Any sudden, unexpected death in which there is no prior history of significant heart disease or any other condition associated with sudden death.   NO A death under suspicious, unusual, or unexpected circumstances.    NO Any death which is apparently due to natural causes but in which the  does not have a personal physician familiar with the patient s medical history, social, or environmental situation or the circumstances of the terminal event.   NO Any death apparently due to Sudden Infant Death Syndrome.     NO Deaths that occur during, in association  with, or as consequences of a diagnostic, therapeutic, or anesthetic procedure.   NO Any death in which a fracture of a major bone has occurred within the past (6) six months.   NO A death of persons note seen by their physician within 120 days of demise.     NO Any death in which the  was an inmate of a public institution or was in the custody of Law Enforcement personnel.   NO  All unexpected deaths of children   NO Solid organ donors   NO Unidentified bodies   NO Deaths of persons whose bodies are to be cremated or otherwise disposed of so that the bodies will later be unavailable for examination;   NO Deaths unattended by a physician outside of a licensed healthcare facility or licensed residential hospice program   NO Deaths occurring within 24 hours of arrival to a health care facility if death is unexpected.    NO Deaths associated with the decedent s employment.   NO Deaths attributed to acts of terrorism.   NO Any death in which there is uncertainty as to whether it is a medical examiner s care should be discussed with the medical investigator.        Body disposition: Body released to the  home.    Alexsander Mejia,

## 2024-03-01 NOTE — DISCHARGE SUMMARY
[facility]  Hospitalist Discharge Summary      Date of Admission:  2024  Date of Discharge:  3/1/2024  1:00 PM  Discharging Provider: Alexsander Mejia DO  Discharge Service: Hospitalist Service    Discharge Diagnoses   Leola Bui is a 88 year old female with a history of RLS, Parkinson's Disease, Dementia in Memory Care, HTN, Diastolic CHF, CKD, Chronic Atrial Fibrillation on Eliquis who presents to the ED today with emesis and unresponsiveness.     History obtained from chart review and ED providers.  Patient presents from her memory care facility where she was found with bloody emesis non responsive around 4am.  No reported fall.  ED work up revealed an intracranial hemorrhage on CT.  ED provider discussed with family and Neurosurgery.  Admission for comfort cares is requested.  Patient  on 3/1 at 11:10 AM with family at bedside.      Intracranial Hemorrhage & vasogenic edema on Eliquis  Pt presented from [facility] care with dark colored emesis and unresponsiveness.  CT head revealed a large parenchymal hematoma centered about the left occipital lobe with suspected active/ongoing bleeding.  Intraventricular extension into the left lateral, third, and fourth ventricles. Dilatation of the left temporal horn suggests ventricular entrapment.  Mass effect from the hematoma results in sulcal effacement throughout the posterior aspect of the left cerebral hemisphere, as well as 13 mm leftward midline shift.  -Neurosurgery consulted, ED provider discussed with family and comfort based treatment was decided  -Comfort care orderset  -Palliative Care followed     Parkinson's Disease with Advanced Dementia    Clinically Significant Risk Factors        Coagulation defect given eliquis use    Follow-ups Needed After Discharge   NA    Unresulted Labs Ordered in the Past 30 Days of this Admission       No orders found for last 31 day(s).        These results will be followed up by NA    Discharge  Disposition   Patient  during this admission  Condition at discharge:     Consultations This Hospital Stay   PALLIATIVE CARE ADULT IP CONSULT  SPIRITUAL HEALTH SERVICES IP CONSULT  SPIRITUAL HEALTH SERVICES IP CONSULT  CARE MANAGEMENT / SOCIAL WORK IP CONSULT    Code Status   No CPR- Do NOT Intubate    Time Spent on this Encounter   I, Alexsander Mejia DO, personally saw the patient today and spent less than or equal to 30 minutes discharging this patient.       Alexsander Mejia DO  [place] [address] [facility]  [address] [place] [address]  Phone: [phone]  Fax: [phone]  ______________________________________________________________________    Physical Exam   Vital Signs:           Resp: 10 SpO2: 96 % O2 Device: None (Room air)    Weight: 0 lbs 0 oz  Please see death exam.       Primary Care Physician   Kristopher Barahona    Discharge Orders   No discharge procedures on file.    Significant Results and Procedures   Most Recent 3 CBC's:  Recent Labs   Lab Test 24  0752 24  0748 24  2019 23  1039   WBC  --  12.9* 10.1 5.7   HGB 15.6 14.7 13.3 13.8   MCV  --  102* 104* 102*   PLT  --  220 197 183     Most Recent 3 BMP's:  Recent Labs   Lab Test 24  0752 24  0748 24  1016 24    136 135 132*   POTASSIUM 4.2 4.2 3.8 5.2   CHLORIDE 101 98 104 101   CO2  --  23 21* 19*   BUN 19 18.3 26.0* 39.3*   CR 0.9 0.84 0.84 1.40*   ANIONGAP  --  15 10 12   DIONE  --  9.4 8.1* 9.0   * 176* 98 106*       Discharge Medications   Current Discharge Medication List        CONTINUE these medications which have NOT CHANGED    Details   acetaminophen (TYLENOL) 500 MG tablet Take 1,000 mg by mouth 3 times daily      allopurinol (ZYLOPRIM) 100 MG tablet Take 100 mg by mouth at bedtime      apixaban ANTICOAGULANT (ELIQUIS) 5 MG tablet Take 5 mg by mouth 2 times daily      Artificial Saliva (BIOTENE DRY MOUTH) LOZG Take 1 lozenge by mouth 4  times daily as needed (dry mouth)      benzocaine (ORAJEL MAXIMUM STRENGTH) 20 % GEL gel Take by mouth 4 times daily as needed for mouth sores (apply pea-size amount to painful teeth/gums)      calcium carbonate (TUMS) 500 MG chewable tablet Take 1 chew tab by mouth every 6 hours as needed for heartburn      carbidopa-levodopa (SINEMET CR)  MG CR tablet Take 1 tablet by mouth at bedtime      !! carbidopa-levodopa (SINEMET)  MG tablet Take 1 tablet by mouth 3 times daily      !! carbidopa-levodopa (SINEMET)  MG tablet Take 0.5 tablets by mouth daily as needed (Parkinson's disease)      !! carbidopa-levodopa (SINEMET)  MG tablet Take 0.5 tablets by mouth at bedtime      cetirizine (ZYRTEC) 5 MG tablet Take 5 mg by mouth daily      Cranberry 250 MG CAPS Take 500 mg by mouth daily      diclofenac (VOLTAREN) 1 % topical gel Apply 4 g topically 3 times daily      doxepin (SILENOR) 3 MG tablet Take 3 mg by mouth at bedtime      fluticasone (FLONASE) 50 MCG/ACT nasal spray Spray 1 spray into both nostrils daily      furosemide (LASIX) 40 MG tablet Take 40 mg by mouth daily      gabapentin (NEURONTIN) 100 MG capsule Take 100 mg by mouth 3 times daily      hydrocortisone (PREPARATION H) 1 % external cream Apply topically daily as needed      hydrOXYzine HCl (ATARAX) 25 MG tablet Take 25 mg by mouth every 8 hours as needed for anxiety      Lactobacillus (ACIDOPHILUS PO) Take 1 capsule by mouth daily 250 million      lidocaine (LMX4) 4 % external cream Apply topically 3 times daily Apply pea sized amount to back three times daily for low back pain      loperamide (IMODIUM) 2 MG capsule Take 2 mg by mouth 2 times daily as needed for diarrhea      !! losartan (COZAAR) 50 MG tablet Take 50 mg by mouth every morning      !! losartan (COZAAR) 50 MG tablet Take 25 mg by mouth every evening      !! menthol-zinc oxide (CALMOSEPTINE) 0.44-20.6 % OINT ointment Apply topically 2 times daily      !! menthol-zinc  oxide (CALMOSEPTINE) 0.44-20.6 % OINT ointment Apply topically as needed for skin protection      metoprolol succinate ER (TOPROL XL) 50 MG 24 hr tablet Take 50 mg by mouth 2 times daily      mirtazapine (REMERON) 7.5 MG tablet Take 7.5 mg by mouth at bedtime      nystatin (MYCOSTATIN) 378742 UNIT/GM external powder Apply topically 2 times daily as needed (under  right breast)      oxyBUTYnin (DITROPAN) 5 MG tablet Take 5 mg by mouth at bedtime      polyethylene glycol (MIRALAX) 17 g packet Take 8.5 g by mouth every other day      polyethylene glycol-propylene glycol (SYSTANE ULTRA) 0.4-0.3 % SOLN ophthalmic solution Place 1 drop into both eyes every 4 hours as needed for dry eyes      potassium chloride (KLOR-CON) 20 MEQ packet Take 20 mEq by mouth 2 times daily      pramipexole (MIRAPEX) 0.125 MG tablet Take 0.125 mg by mouth every evening      pramox-pe-glycerin-petrolatum (PREPARATION H) 1-0.25-14.4-15 % CREA cream Place rectally 4 times daily as needed for hemorrhoids      sertraline (ZOLOFT) 100 MG tablet Take 100 mg by mouth daily      simethicone (MYLICON) 125 MG chewable tablet Take 125 mg by mouth 4 times daily as needed for intestinal gas      traMADol (ULTRAM) 50 MG tablet Take 25 mg by mouth 3 times daily      vitamin D3 (CHOLECALCIFEROL) 50 mcg (2000 units) tablet Take 50 mcg by mouth daily       !! - Potential duplicate medications found. Please discuss with provider.        Allergies   Allergies   Allergen Reactions    Erythromycin Hives and Rash    Penicillins Hives, Itching and Rash    Sulfa Antibiotics Hives and Rash    Amlodipine      Other reaction(s): *Unknown    Carvedilol      Other reaction(s): Syncope    Clonidine      Other reaction(s): *Unknown    Diltiazem      Other reaction(s): *Unknown    Flagyl [Metronidazole]     Hydrochlorothiazide      Other reaction(s): *Unknown    Lisinopril

## (undated) DEVICE — EYE SHIELD PLASTIC CLEAR UNIVERSAL K9-6050

## (undated) DEVICE — STRAP KNEE/BODY 31143004

## (undated) DEVICE — EYE KNIFE STILETTO VISITEC 1.1MM ANG 45DEG SIDEPORT 376620

## (undated) DEVICE — EYE CANN IRRIG CORTICAL CLVNG HYDRDISCTR 27GAX7/8" BC585158

## (undated) DEVICE — SYR 03ML LL W/O NDL 309657

## (undated) DEVICE — EYE COVER TONOPEN OCU FILM LATEX 230651-002

## (undated) DEVICE — EYE PREP BETADINE 5% SOLUTION 30ML 0065-0411-30

## (undated) DEVICE — ATTENTION CASE CART PLEASE PICK

## (undated) DEVICE — BLADE KNIFE BEAVER MICROSHARP GREEN 377515

## (undated) DEVICE — EYE CANN IRR 30GA  ANTERIOR CHAMBER 581273

## (undated) DEVICE — TAPE TRANSPORE 1"X1.5YD 1534S-1

## (undated) DEVICE — PACK CATARACT UMMC

## (undated) DEVICE — EYE CANN IRR 25GA CYSTOTOME 581610

## (undated) DEVICE — SOL NACL 0.9% 10ML VIAL 0409-4888-02

## (undated) DEVICE — EYE PACK CUSTOM ANTERIOR 30DEG TIP CENTURION PPK6682-04

## (undated) DEVICE — SYR 01ML LL W/O NDL LATEX FREE 309628

## (undated) DEVICE — EYE LENS CARTRIDGE D ALCON MONARCH

## (undated) DEVICE — POSITIONER ARMBOARD FOAM 1PAIR LF FP-ARMB1

## (undated) DEVICE — GLOVE BIOGEL PI MICRO INDICATOR UNDERGLOVE SZ 7.5 48975

## (undated) DEVICE — LINEN TOWEL PACK X5 5464

## (undated) DEVICE — EYE TIP IRRIGATION & ASPIRATION POLYMER CVD 0.3MM 8065751512

## (undated) DEVICE — GLOVE PROTEXIS MICRO 7.5  2D73PM75

## (undated) DEVICE — EYE KNIFE SLIT XSTAR VISITEC 2.6MM 45DEG 373726

## (undated) RX ORDER — PROPARACAINE HYDROCHLORIDE 5 MG/ML
SOLUTION/ DROPS OPHTHALMIC
Status: DISPENSED
Start: 2022-09-19

## (undated) RX ORDER — ACETAMINOPHEN 325 MG/1
TABLET ORAL
Status: DISPENSED
Start: 2022-12-26

## (undated) RX ORDER — PROPARACAINE HYDROCHLORIDE 5 MG/ML
SOLUTION/ DROPS OPHTHALMIC
Status: DISPENSED
Start: 2022-12-26

## (undated) RX ORDER — FENTANYL CITRATE 50 UG/ML
INJECTION, SOLUTION INTRAMUSCULAR; INTRAVENOUS
Status: DISPENSED
Start: 2022-10-03

## (undated) RX ORDER — PROPARACAINE HYDROCHLORIDE 5 MG/ML
SOLUTION/ DROPS OPHTHALMIC
Status: DISPENSED
Start: 2022-10-03

## (undated) RX ORDER — PROPOFOL 10 MG/ML
INJECTION, EMULSION INTRAVENOUS
Status: DISPENSED
Start: 2022-09-19

## (undated) RX ORDER — CYCLOPENTOLAT/TROPIC/PHENYLEPH 1%-1%-2.5%
DROPS (EA) OPHTHALMIC (EYE)
Status: DISPENSED
Start: 2022-09-19

## (undated) RX ORDER — PROPOFOL 10 MG/ML
INJECTION, EMULSION INTRAVENOUS
Status: DISPENSED
Start: 2022-12-26

## (undated) RX ORDER — MOXIFLOXACIN 5 MG/ML
SOLUTION/ DROPS OPHTHALMIC
Status: DISPENSED
Start: 2022-09-19

## (undated) RX ORDER — FENTANYL CITRATE 50 UG/ML
INJECTION, SOLUTION INTRAMUSCULAR; INTRAVENOUS
Status: DISPENSED
Start: 2022-09-19

## (undated) RX ORDER — DEXAMETHASONE SODIUM PHOSPHATE 4 MG/ML
INJECTION, SOLUTION INTRA-ARTICULAR; INTRALESIONAL; INTRAMUSCULAR; INTRAVENOUS; SOFT TISSUE
Status: DISPENSED
Start: 2022-12-26

## (undated) RX ORDER — FENTANYL CITRATE 50 UG/ML
INJECTION, SOLUTION INTRAMUSCULAR; INTRAVENOUS
Status: DISPENSED
Start: 2022-12-26

## (undated) RX ORDER — CYCLOPENTOLAT/TROPIC/PHENYLEPH 1%-1%-2.5%
DROPS (EA) OPHTHALMIC (EYE)
Status: DISPENSED
Start: 2022-10-03

## (undated) RX ORDER — GLYCOPYRROLATE 0.2 MG/ML
INJECTION, SOLUTION INTRAMUSCULAR; INTRAVENOUS
Status: DISPENSED
Start: 2022-12-26

## (undated) RX ORDER — LABETALOL HYDROCHLORIDE 5 MG/ML
INJECTION, SOLUTION INTRAVENOUS
Status: DISPENSED
Start: 2022-12-26

## (undated) RX ORDER — ONDANSETRON 2 MG/ML
INJECTION INTRAMUSCULAR; INTRAVENOUS
Status: DISPENSED
Start: 2022-09-19

## (undated) RX ORDER — LIDOCAINE HYDROCHLORIDE 20 MG/ML
INJECTION, SOLUTION EPIDURAL; INFILTRATION; INTRACAUDAL; PERINEURAL
Status: DISPENSED
Start: 2022-09-19

## (undated) RX ORDER — CYCLOPENTOLAT/TROPIC/PHENYLEPH 1%-1%-2.5%
DROPS (EA) OPHTHALMIC (EYE)
Status: DISPENSED
Start: 2022-12-26